# Patient Record
Sex: FEMALE | Race: WHITE | NOT HISPANIC OR LATINO | Employment: UNEMPLOYED | URBAN - NONMETROPOLITAN AREA
[De-identification: names, ages, dates, MRNs, and addresses within clinical notes are randomized per-mention and may not be internally consistent; named-entity substitution may affect disease eponyms.]

---

## 2017-01-01 ENCOUNTER — HOSPITAL ENCOUNTER (EMERGENCY)
Facility: HOSPITAL | Age: 52
Discharge: LEFT BEFORE DISCHARGE | End: 2017-08-18

## 2017-01-01 ENCOUNTER — TRANSCRIBE ORDERS (OUTPATIENT)
Dept: CARDIOLOGY | Facility: HOSPITAL | Age: 52
End: 2017-01-01

## 2017-01-01 ENCOUNTER — TRANSCRIBE ORDERS (OUTPATIENT)
Dept: ADMINISTRATIVE | Facility: HOSPITAL | Age: 52
End: 2017-01-01

## 2017-01-01 ENCOUNTER — OFFICE VISIT (OUTPATIENT)
Dept: ORTHOPEDIC SURGERY | Facility: CLINIC | Age: 52
End: 2017-01-01

## 2017-01-01 ENCOUNTER — TELEPHONE (OUTPATIENT)
Dept: NEUROLOGY | Facility: CLINIC | Age: 52
End: 2017-01-01

## 2017-01-01 ENCOUNTER — HOSPITAL ENCOUNTER (OUTPATIENT)
Facility: HOSPITAL | Age: 52
Discharge: HOME | End: 2017-10-06
Attending: INTERNAL MEDICINE | Admitting: INTERNAL MEDICINE

## 2017-01-01 ENCOUNTER — LAB REQUISITION (OUTPATIENT)
Dept: LAB | Facility: HOSPITAL | Age: 52
End: 2017-01-01

## 2017-01-01 ENCOUNTER — HOSPITAL ENCOUNTER (OUTPATIENT)
Facility: HOSPITAL | Age: 52
Discharge: HOME | End: 2017-08-18
Attending: INTERNAL MEDICINE | Admitting: INTERNAL MEDICINE

## 2017-01-01 ENCOUNTER — HOSPITAL ENCOUNTER (OUTPATIENT)
Facility: HOSPITAL | Age: 52
Discharge: HOME | End: 2017-08-04
Attending: INTERNAL MEDICINE | Admitting: INTERNAL MEDICINE

## 2017-01-01 ENCOUNTER — HOSPITAL ENCOUNTER (EMERGENCY)
Facility: HOSPITAL | Age: 52
Discharge: HOME | End: 2017-09-06
Attending: EMERGENCY MEDICINE | Admitting: EMERGENCY MEDICINE

## 2017-01-01 ENCOUNTER — APPOINTMENT (OUTPATIENT)
Dept: INFUSION THERAPY | Facility: HOSPITAL | Age: 52
End: 2017-01-01
Attending: INTERNAL MEDICINE

## 2017-01-01 ENCOUNTER — APPOINTMENT (OUTPATIENT)
Dept: ULTRASOUND IMAGING | Facility: HOSPITAL | Age: 52
End: 2017-01-01

## 2017-01-01 ENCOUNTER — OFFICE VISIT (OUTPATIENT)
Dept: NEUROLOGY | Facility: CLINIC | Age: 52
End: 2017-01-01

## 2017-01-01 ENCOUNTER — HOSPITAL ENCOUNTER (EMERGENCY)
Facility: HOSPITAL | Age: 52
Discharge: HOME | End: 2017-08-18
Attending: EMERGENCY MEDICINE | Admitting: EMERGENCY MEDICINE

## 2017-01-01 ENCOUNTER — LAB (OUTPATIENT)
Dept: LAB | Facility: HOSPITAL | Age: 52
End: 2017-01-01

## 2017-01-01 ENCOUNTER — HOSPITAL ENCOUNTER (OUTPATIENT)
Dept: MRI IMAGING | Facility: HOSPITAL | Age: 52
Discharge: HOME | End: 2017-09-14
Attending: PODIATRIST | Admitting: PODIATRIST

## 2017-01-01 VITALS
HEART RATE: 105 BPM | TEMPERATURE: 97.7 F | WEIGHT: 293 LBS | SYSTOLIC BLOOD PRESSURE: 137 MMHG | OXYGEN SATURATION: 93 % | BODY MASS INDEX: 47.09 KG/M2 | DIASTOLIC BLOOD PRESSURE: 82 MMHG | HEIGHT: 66 IN | RESPIRATION RATE: 19 BRPM

## 2017-01-01 VITALS — BODY MASS INDEX: 47.09 KG/M2 | RESPIRATION RATE: 18 BRPM | HEIGHT: 66 IN | WEIGHT: 293 LBS

## 2017-01-01 VITALS
OXYGEN SATURATION: 94 % | DIASTOLIC BLOOD PRESSURE: 89 MMHG | TEMPERATURE: 97.6 F | HEART RATE: 95 BPM | SYSTOLIC BLOOD PRESSURE: 135 MMHG | RESPIRATION RATE: 18 BRPM

## 2017-01-01 VITALS — WEIGHT: 293 LBS | HEIGHT: 66 IN | RESPIRATION RATE: 16 BRPM | BODY MASS INDEX: 47.09 KG/M2

## 2017-01-01 VITALS
HEIGHT: 66 IN | HEART RATE: 100 BPM | BODY MASS INDEX: 47.09 KG/M2 | OXYGEN SATURATION: 94 % | TEMPERATURE: 98 F | DIASTOLIC BLOOD PRESSURE: 91 MMHG | WEIGHT: 293 LBS | RESPIRATION RATE: 18 BRPM | SYSTOLIC BLOOD PRESSURE: 155 MMHG

## 2017-01-01 VITALS
HEIGHT: 66 IN | HEART RATE: 90 BPM | OXYGEN SATURATION: 96 % | WEIGHT: 293 LBS | SYSTOLIC BLOOD PRESSURE: 124 MMHG | DIASTOLIC BLOOD PRESSURE: 70 MMHG | BODY MASS INDEX: 47.09 KG/M2

## 2017-01-01 VITALS
HEIGHT: 66 IN | BODY MASS INDEX: 47.09 KG/M2 | SYSTOLIC BLOOD PRESSURE: 124 MMHG | RESPIRATION RATE: 20 BRPM | OXYGEN SATURATION: 93 % | HEART RATE: 81 BPM | TEMPERATURE: 98.4 F | WEIGHT: 293 LBS | DIASTOLIC BLOOD PRESSURE: 83 MMHG

## 2017-01-01 VITALS
SYSTOLIC BLOOD PRESSURE: 137 MMHG | BODY MASS INDEX: 47.09 KG/M2 | DIASTOLIC BLOOD PRESSURE: 86 MMHG | HEIGHT: 66 IN | OXYGEN SATURATION: 94 % | RESPIRATION RATE: 20 BRPM | TEMPERATURE: 97.7 F | WEIGHT: 293 LBS | HEART RATE: 95 BPM

## 2017-01-01 VITALS
TEMPERATURE: 97.2 F | HEART RATE: 78 BPM | HEIGHT: 66 IN | SYSTOLIC BLOOD PRESSURE: 123 MMHG | RESPIRATION RATE: 16 BRPM | DIASTOLIC BLOOD PRESSURE: 60 MMHG | OXYGEN SATURATION: 96 % | BODY MASS INDEX: 47.09 KG/M2 | WEIGHT: 293 LBS

## 2017-01-01 VITALS — BODY MASS INDEX: 47.09 KG/M2 | WEIGHT: 293 LBS | RESPIRATION RATE: 16 BRPM | HEIGHT: 66 IN

## 2017-01-01 VITALS — BODY MASS INDEX: 48.82 KG/M2 | HEIGHT: 65 IN | RESPIRATION RATE: 18 BRPM | WEIGHT: 293 LBS

## 2017-01-01 DIAGNOSIS — I73.9 PVD (PERIPHERAL VASCULAR DISEASE) (HCC): ICD-10-CM

## 2017-01-01 DIAGNOSIS — E66.01 MORBID (SEVERE) OBESITY DUE TO EXCESS CALORIES (HCC): ICD-10-CM

## 2017-01-01 DIAGNOSIS — I87.2 VENOUS INSUFFICIENCY (CHRONIC) (PERIPHERAL): ICD-10-CM

## 2017-01-01 DIAGNOSIS — M54.2 CERVICALGIA: Primary | ICD-10-CM

## 2017-01-01 DIAGNOSIS — T82.898A OCCLUDED PICC LINE, INITIAL ENCOUNTER (HCC): Primary | ICD-10-CM

## 2017-01-01 DIAGNOSIS — E11.622 TYPE 2 DIABETES MELLITUS WITH OTHER SKIN ULCER (HCC): ICD-10-CM

## 2017-01-01 DIAGNOSIS — R60.0 EDEMA OF EXTREMITY OF UNKNOWN CAUSE: ICD-10-CM

## 2017-01-01 DIAGNOSIS — L98.499 TYPE 2 DIABETES MELLITUS WITH OTHER SKIN ULCER (HCC): ICD-10-CM

## 2017-01-01 DIAGNOSIS — E11.622 TYPE 2 DIABETES MELLITUS WITH OTHER SKIN ULCER (HCC): Primary | ICD-10-CM

## 2017-01-01 DIAGNOSIS — L97.511 ULCER OF FOOT, RIGHT, LIMITED TO BREAKDOWN OF SKIN (HCC): ICD-10-CM

## 2017-01-01 DIAGNOSIS — L03.119 CELLULITIS AND ABSCESS OF LEG: ICD-10-CM

## 2017-01-01 DIAGNOSIS — L97.511 ULCER OF FOOT, RIGHT, LIMITED TO BREAKDOWN OF SKIN (HCC): Primary | ICD-10-CM

## 2017-01-01 DIAGNOSIS — L03.115 CELLULITIS OF RIGHT LOWER EXTREMITY: ICD-10-CM

## 2017-01-01 DIAGNOSIS — E11.622 TYPE 2 DIABETES MELLITUS WITH OTHER SKIN ULCER, WITH LONG-TERM CURRENT USE OF INSULIN (HCC): ICD-10-CM

## 2017-01-01 DIAGNOSIS — M54.2 CERVICALGIA: ICD-10-CM

## 2017-01-01 DIAGNOSIS — Z79.4 TYPE 2 DIABETES MELLITUS WITH OTHER SKIN ULCER, WITH LONG-TERM CURRENT USE OF INSULIN (HCC): ICD-10-CM

## 2017-01-01 DIAGNOSIS — I73.9 PVD (PERIPHERAL VASCULAR DISEASE) (HCC): Primary | ICD-10-CM

## 2017-01-01 DIAGNOSIS — E11.9 TYPE 2 DIABETES MELLITUS WITHOUT COMPLICATIONS (HCC): ICD-10-CM

## 2017-01-01 DIAGNOSIS — L03.90 CELLULITIS: ICD-10-CM

## 2017-01-01 DIAGNOSIS — G44.221 CHRONIC TENSION-TYPE HEADACHE, INTRACTABLE: ICD-10-CM

## 2017-01-01 DIAGNOSIS — L03.119 CELLULITIS OF EXTREMITY: ICD-10-CM

## 2017-01-01 DIAGNOSIS — L98.499 TYPE 2 DIABETES MELLITUS WITH OTHER SKIN ULCER (HCC): Primary | ICD-10-CM

## 2017-01-01 DIAGNOSIS — L03.116 CELLULITIS OF LEFT LEG: Primary | ICD-10-CM

## 2017-01-01 DIAGNOSIS — L03.119 CELLULITIS OF LOWER EXTREMITY, UNSPECIFIED LATERALITY: ICD-10-CM

## 2017-01-01 DIAGNOSIS — M79.3 PANNICULITIS: ICD-10-CM

## 2017-01-01 DIAGNOSIS — L03.116 CELLULITIS OF LEFT LOWER EXTREMITY: ICD-10-CM

## 2017-01-01 DIAGNOSIS — I87.2 VENOUS INSUFFICIENCY (CHRONIC) (PERIPHERAL): Primary | ICD-10-CM

## 2017-01-01 DIAGNOSIS — L02.419 CELLULITIS AND ABSCESS OF LEG: ICD-10-CM

## 2017-01-01 DIAGNOSIS — L03.115 CELLULITIS OF RIGHT LEG: Primary | ICD-10-CM

## 2017-01-01 LAB
ALBUMIN SERPL-MCNC: 3.6 G/DL (ref 3.5–5)
ALBUMIN SERPL-MCNC: 3.7 G/DL (ref 3.5–5)
ALBUMIN SERPL-MCNC: 3.8 G/DL (ref 3.5–5)
ALBUMIN SERPL-MCNC: 4 G/DL (ref 3.5–5)
ALBUMIN SERPL-MCNC: 4 G/DL (ref 3.5–5)
ALBUMIN SERPL-MCNC: 4.2 G/DL (ref 3.2–4.8)
ALBUMIN/GLOB SERPL: 1.2 G/DL (ref 1–2)
ALBUMIN/GLOB SERPL: 1.3 G/DL (ref 1–2)
ALBUMIN/GLOB SERPL: 1.3 G/DL (ref 1–2)
ALBUMIN/GLOB SERPL: 1.4 G/DL (ref 1–2)
ALBUMIN/GLOB SERPL: 1.4 G/DL (ref 1–2)
ALBUMIN/GLOB SERPL: 1.5 G/DL (ref 1.5–2.5)
ALP SERPL-CCNC: 115 U/L (ref 25–100)
ALP SERPL-CCNC: 78 U/L (ref 38–126)
ALP SERPL-CCNC: 79 U/L (ref 38–126)
ALP SERPL-CCNC: 90 U/L (ref 38–126)
ALP SERPL-CCNC: 91 U/L (ref 38–126)
ALP SERPL-CCNC: 91 U/L (ref 38–126)
ALT SERPL W P-5'-P-CCNC: 28 U/L (ref 7–40)
ALT SERPL W P-5'-P-CCNC: 36 U/L (ref 13–69)
ALT SERPL W P-5'-P-CCNC: 37 U/L (ref 13–69)
ALT SERPL W P-5'-P-CCNC: 37 U/L (ref 13–69)
ALT SERPL W P-5'-P-CCNC: 40 U/L (ref 13–69)
ALT SERPL W P-5'-P-CCNC: 42 U/L (ref 13–69)
ANION GAP SERPL CALCULATED.3IONS-SCNC: 13.1 MMOL/L
ANION GAP SERPL CALCULATED.3IONS-SCNC: 13.8 MMOL/L
ANION GAP SERPL CALCULATED.3IONS-SCNC: 14.7 MMOL/L
ANION GAP SERPL CALCULATED.3IONS-SCNC: 15.8 MMOL/L
ANION GAP SERPL CALCULATED.3IONS-SCNC: 15.8 MMOL/L
ANION GAP SERPL CALCULATED.3IONS-SCNC: 18 MMOL/L
ANION GAP SERPL CALCULATED.3IONS-SCNC: 3 MMOL/L (ref 3–11)
APTT PPP: 28.8 SECONDS (ref 25–36)
APTT PPP: 31.2 SECONDS (ref 25–36)
AST SERPL-CCNC: 16 U/L (ref 15–46)
AST SERPL-CCNC: 17 U/L (ref 15–46)
AST SERPL-CCNC: 19 U/L (ref 15–46)
AST SERPL-CCNC: 20 U/L (ref 0–33)
AST SERPL-CCNC: 21 U/L (ref 15–46)
AST SERPL-CCNC: 24 U/L (ref 15–46)
BACTERIA SPEC AEROBE CULT: ABNORMAL
BASOPHILS # BLD AUTO: 0.03 10*3/MM3 (ref 0–0.2)
BASOPHILS # BLD AUTO: 0.04 10*3/MM3 (ref 0–0.2)
BASOPHILS # BLD AUTO: 0.05 10*3/MM3 (ref 0–0.2)
BASOPHILS # BLD AUTO: 0.06 10*3/MM3 (ref 0–0.2)
BASOPHILS # BLD AUTO: 0.06 10*3/MM3 (ref 0–0.2)
BASOPHILS # BLD AUTO: 0.07 10*3/MM3 (ref 0–0.2)
BASOPHILS NFR BLD AUTO: 0.3 % (ref 0–1)
BASOPHILS NFR BLD AUTO: 0.4 % (ref 0–2.5)
BASOPHILS NFR BLD AUTO: 0.5 % (ref 0–2.5)
BASOPHILS NFR BLD AUTO: 0.5 % (ref 0–2.5)
BASOPHILS NFR BLD AUTO: 0.6 % (ref 0–2.5)
BASOPHILS NFR BLD AUTO: 0.6 % (ref 0–2.5)
BILIRUB SERPL-MCNC: 0.3 MG/DL (ref 0.2–1.3)
BILIRUB SERPL-MCNC: 0.3 MG/DL (ref 0.2–1.3)
BILIRUB SERPL-MCNC: 0.3 MG/DL (ref 0.3–1.2)
BILIRUB SERPL-MCNC: 0.4 MG/DL (ref 0.2–1.3)
BILIRUB SERPL-MCNC: 0.4 MG/DL (ref 0.2–1.3)
BILIRUB SERPL-MCNC: 0.5 MG/DL (ref 0.2–1.3)
BUN BLD-MCNC: 19 MG/DL (ref 7–20)
BUN BLD-MCNC: 24 MG/DL (ref 7–20)
BUN BLD-MCNC: 31 MG/DL (ref 7–20)
BUN BLD-MCNC: 32 MG/DL (ref 9–23)
BUN BLD-MCNC: 42 MG/DL (ref 7–20)
BUN BLD-MCNC: 47 MG/DL (ref 7–20)
BUN BLD-MCNC: 50 MG/DL (ref 7–20)
BUN/CREAT SERPL: 15.8 (ref 7.1–23.5)
BUN/CREAT SERPL: 16 (ref 7.1–23.5)
BUN/CREAT SERPL: 23.8 (ref 7.1–23.5)
BUN/CREAT SERPL: 24.6 (ref 7–25)
BUN/CREAT SERPL: 27.6 (ref 7.1–23.5)
BUN/CREAT SERPL: 30 (ref 7.1–23.5)
BUN/CREAT SERPL: 38.5 (ref 7.1–23.5)
CALCIUM SPEC-SCNC: 9 MG/DL (ref 8.4–10.2)
CALCIUM SPEC-SCNC: 9.1 MG/DL (ref 8.4–10.2)
CALCIUM SPEC-SCNC: 9.1 MG/DL (ref 8.4–10.2)
CALCIUM SPEC-SCNC: 9.2 MG/DL (ref 8.4–10.2)
CALCIUM SPEC-SCNC: 9.3 MG/DL (ref 8.4–10.2)
CALCIUM SPEC-SCNC: 9.4 MG/DL (ref 8.4–10.2)
CALCIUM SPEC-SCNC: 9.5 MG/DL (ref 8.7–10.4)
CHLORIDE SERPL-SCNC: 100 MMOL/L (ref 98–107)
CHLORIDE SERPL-SCNC: 101 MMOL/L (ref 98–107)
CHLORIDE SERPL-SCNC: 101 MMOL/L (ref 99–109)
CHLORIDE SERPL-SCNC: 104 MMOL/L (ref 98–107)
CHLORIDE SERPL-SCNC: 94 MMOL/L (ref 98–107)
CHLORIDE SERPL-SCNC: 96 MMOL/L (ref 98–107)
CHLORIDE SERPL-SCNC: 99 MMOL/L (ref 98–107)
CO2 SERPL-SCNC: 27 MMOL/L (ref 26–30)
CO2 SERPL-SCNC: 27 MMOL/L (ref 26–30)
CO2 SERPL-SCNC: 28 MMOL/L (ref 26–30)
CO2 SERPL-SCNC: 29 MMOL/L (ref 26–30)
CO2 SERPL-SCNC: 29 MMOL/L (ref 26–30)
CO2 SERPL-SCNC: 30 MMOL/L (ref 20–31)
CO2 SERPL-SCNC: 35 MMOL/L (ref 26–30)
CREAT BLD-MCNC: 1.2 MG/DL (ref 0.6–1.3)
CREAT BLD-MCNC: 1.3 MG/DL (ref 0.6–1.3)
CREAT BLD-MCNC: 1.4 MG/DL (ref 0.6–1.3)
CREAT BLD-MCNC: 1.5 MG/DL (ref 0.6–1.3)
CREAT BLD-MCNC: 1.7 MG/DL (ref 0.6–1.3)
CRP SERPL-MCNC: 3.7 MG/DL (ref 0–1)
CRP SERPL-MCNC: 4.2 MG/DL (ref 0–1)
CRP SERPL-MCNC: 5.1 MG/DL (ref 0–1)
DEPRECATED RDW RBC AUTO: 48.3 FL (ref 37–54)
DEPRECATED RDW RBC AUTO: 48.9 FL (ref 37–54)
DEPRECATED RDW RBC AUTO: 49.5 FL (ref 37–54)
DEPRECATED RDW RBC AUTO: 50.3 FL (ref 37–54)
DEPRECATED RDW RBC AUTO: 51.5 FL (ref 37–54)
DEPRECATED RDW RBC AUTO: 53.9 FL (ref 37–54)
EOSINOPHIL # BLD AUTO: 0.23 10*3/MM3 (ref 0–0.7)
EOSINOPHIL # BLD AUTO: 0.24 10*3/MM3 (ref 0–0.7)
EOSINOPHIL # BLD AUTO: 0.25 10*3/MM3 (ref 0–0.3)
EOSINOPHIL # BLD AUTO: 0.26 10*3/MM3 (ref 0–0.7)
EOSINOPHIL # BLD AUTO: 0.26 10*3/MM3 (ref 0–0.7)
EOSINOPHIL # BLD AUTO: 0.3 10*3/MM3 (ref 0–0.7)
EOSINOPHIL NFR BLD AUTO: 1.8 % (ref 0–7)
EOSINOPHIL NFR BLD AUTO: 2.2 % (ref 0–3)
EOSINOPHIL NFR BLD AUTO: 2.4 % (ref 0–7)
EOSINOPHIL NFR BLD AUTO: 2.4 % (ref 0–7)
EOSINOPHIL NFR BLD AUTO: 2.7 % (ref 0–7)
EOSINOPHIL NFR BLD AUTO: 2.8 % (ref 0–7)
ERYTHROCYTE [DISTWIDTH] IN BLOOD BY AUTOMATED COUNT: 15.3 % (ref 11.5–14.5)
ERYTHROCYTE [DISTWIDTH] IN BLOOD BY AUTOMATED COUNT: 15.4 % (ref 11.5–14.5)
ERYTHROCYTE [DISTWIDTH] IN BLOOD BY AUTOMATED COUNT: 15.7 % (ref 11.5–14.5)
ERYTHROCYTE [DISTWIDTH] IN BLOOD BY AUTOMATED COUNT: 15.9 % (ref 11.5–14.5)
ERYTHROCYTE [DISTWIDTH] IN BLOOD BY AUTOMATED COUNT: 16 % (ref 11.3–14.5)
ERYTHROCYTE [DISTWIDTH] IN BLOOD BY AUTOMATED COUNT: 16.8 % (ref 11.5–14.5)
ERYTHROCYTE [SEDIMENTATION RATE] IN BLOOD: 33 MM/HR (ref 0–20)
ERYTHROCYTE [SEDIMENTATION RATE] IN BLOOD: 35 MM/HR (ref 0–20)
ERYTHROCYTE [SEDIMENTATION RATE] IN BLOOD: 35 MM/HR (ref 0–20)
GFR SERPL CREATININE-BSD FRML MDRD: 32 ML/MIN/1.73
GFR SERPL CREATININE-BSD FRML MDRD: 37 ML/MIN/1.73
GFR SERPL CREATININE-BSD FRML MDRD: 40 ML/MIN/1.73
GFR SERPL CREATININE-BSD FRML MDRD: 43 ML/MIN/1.73
GFR SERPL CREATININE-BSD FRML MDRD: 48 ML/MIN/1.73
GLOBULIN UR ELPH-MCNC: 2.7 GM/DL
GLOBULIN UR ELPH-MCNC: 2.8 GM/DL
GLOBULIN UR ELPH-MCNC: 2.9 GM/DL
GLOBULIN UR ELPH-MCNC: 3.2 GM/DL
GLUCOSE BLD-MCNC: 104 MG/DL (ref 74–98)
GLUCOSE BLD-MCNC: 113 MG/DL (ref 74–98)
GLUCOSE BLD-MCNC: 115 MG/DL (ref 74–98)
GLUCOSE BLD-MCNC: 127 MG/DL (ref 70–100)
GLUCOSE BLD-MCNC: 137 MG/DL (ref 74–98)
GLUCOSE BLD-MCNC: 150 MG/DL (ref 74–98)
GLUCOSE BLD-MCNC: 169 MG/DL (ref 74–98)
GRAM STN SPEC: ABNORMAL
HCT VFR BLD AUTO: 37.4 % (ref 37–47)
HCT VFR BLD AUTO: 38.6 % (ref 37–47)
HCT VFR BLD AUTO: 39.2 % (ref 37–47)
HCT VFR BLD AUTO: 39.3 % (ref 37–47)
HCT VFR BLD AUTO: 39.8 % (ref 37–47)
HCT VFR BLD AUTO: 41.4 % (ref 34.5–44)
HGB BLD-MCNC: 11.8 G/DL (ref 12–16)
HGB BLD-MCNC: 12.1 G/DL (ref 12–16)
HGB BLD-MCNC: 12.3 G/DL (ref 12–16)
HGB BLD-MCNC: 12.3 G/DL (ref 12–16)
HGB BLD-MCNC: 12.5 G/DL (ref 12–16)
HGB BLD-MCNC: 13.1 G/DL (ref 11.5–15.5)
HOLD SPECIMEN: NORMAL
HOLD SPECIMEN: NORMAL
IMM GRANULOCYTES # BLD: 0.04 10*3/MM3 (ref 0–0.03)
IMM GRANULOCYTES # BLD: 0.05 10*3/MM3 (ref 0–0.06)
IMM GRANULOCYTES # BLD: 0.08 10*3/MM3 (ref 0–0.06)
IMM GRANULOCYTES # BLD: 0.09 10*3/MM3 (ref 0–0.06)
IMM GRANULOCYTES NFR BLD: 0.4 % (ref 0–0.6)
IMM GRANULOCYTES NFR BLD: 0.5 % (ref 0–0.6)
IMM GRANULOCYTES NFR BLD: 0.5 % (ref 0–0.6)
IMM GRANULOCYTES NFR BLD: 0.6 % (ref 0–0.6)
IMM GRANULOCYTES NFR BLD: 0.7 % (ref 0–0.6)
IMM GRANULOCYTES NFR BLD: 0.7 % (ref 0–0.6)
INR PPP: 1.17 (ref 0.9–1.1)
INR PPP: 1.18 (ref 0.9–1.1)
LYMPHOCYTES # BLD AUTO: 1.85 10*3/MM3 (ref 0.6–3.4)
LYMPHOCYTES # BLD AUTO: 1.91 10*3/MM3 (ref 0.6–4.8)
LYMPHOCYTES # BLD AUTO: 2.04 10*3/MM3 (ref 0.6–3.4)
LYMPHOCYTES # BLD AUTO: 2.09 10*3/MM3 (ref 0.6–3.4)
LYMPHOCYTES # BLD AUTO: 2.18 10*3/MM3 (ref 0.6–3.4)
LYMPHOCYTES # BLD AUTO: 2.25 10*3/MM3 (ref 0.6–3.4)
LYMPHOCYTES NFR BLD AUTO: 16.5 % (ref 10–50)
LYMPHOCYTES NFR BLD AUTO: 17.1 % (ref 24–44)
LYMPHOCYTES NFR BLD AUTO: 19 % (ref 10–50)
LYMPHOCYTES NFR BLD AUTO: 19.9 % (ref 10–50)
LYMPHOCYTES NFR BLD AUTO: 20.3 % (ref 10–50)
LYMPHOCYTES NFR BLD AUTO: 21.7 % (ref 10–50)
MCH RBC QN AUTO: 27.3 PG (ref 27–31)
MCH RBC QN AUTO: 27.4 PG (ref 27–31)
MCH RBC QN AUTO: 27.5 PG (ref 27–31)
MCH RBC QN AUTO: 27.5 PG (ref 27–31)
MCH RBC QN AUTO: 27.6 PG (ref 27–31)
MCH RBC QN AUTO: 27.7 PG (ref 27–31)
MCHC RBC AUTO-ENTMCNC: 30.9 G/DL (ref 30–37)
MCHC RBC AUTO-ENTMCNC: 31.3 G/DL (ref 30–37)
MCHC RBC AUTO-ENTMCNC: 31.3 G/DL (ref 30–37)
MCHC RBC AUTO-ENTMCNC: 31.6 G/DL (ref 30–37)
MCHC RBC AUTO-ENTMCNC: 31.6 G/DL (ref 32–36)
MCHC RBC AUTO-ENTMCNC: 31.9 G/DL (ref 30–37)
MCV RBC AUTO: 86.8 FL (ref 80–99)
MCV RBC AUTO: 86.9 FL (ref 81–99)
MCV RBC AUTO: 87.5 FL (ref 81–99)
MCV RBC AUTO: 87.6 FL (ref 81–99)
MCV RBC AUTO: 87.7 FL (ref 81–99)
MCV RBC AUTO: 88.4 FL (ref 81–99)
MONOCYTES # BLD AUTO: 0.63 10*3/MM3 (ref 0–0.9)
MONOCYTES # BLD AUTO: 0.69 10*3/MM3 (ref 0–0.9)
MONOCYTES # BLD AUTO: 0.73 10*3/MM3 (ref 0–1)
MONOCYTES # BLD AUTO: 0.76 10*3/MM3 (ref 0–0.9)
MONOCYTES # BLD AUTO: 0.78 10*3/MM3 (ref 0–0.9)
MONOCYTES # BLD AUTO: 0.85 10*3/MM3 (ref 0–0.9)
MONOCYTES NFR BLD AUTO: 6 % (ref 0–12)
MONOCYTES NFR BLD AUTO: 6.4 % (ref 0–12)
MONOCYTES NFR BLD AUTO: 6.5 % (ref 0–12)
MONOCYTES NFR BLD AUTO: 7 % (ref 0–12)
MONOCYTES NFR BLD AUTO: 7.4 % (ref 0–12)
MONOCYTES NFR BLD AUTO: 7.8 % (ref 0–12)
NEUTROPHILS # BLD AUTO: 5.72 10*3/MM3 (ref 2–6.9)
NEUTROPHILS # BLD AUTO: 7.54 10*3/MM3 (ref 2–6.9)
NEUTROPHILS # BLD AUTO: 7.62 10*3/MM3 (ref 2–6.9)
NEUTROPHILS # BLD AUTO: 7.63 10*3/MM3 (ref 2–6.9)
NEUTROPHILS # BLD AUTO: 8.24 10*3/MM3 (ref 1.5–8.3)
NEUTROPHILS # BLD AUTO: 9.41 10*3/MM3 (ref 2–6.9)
NEUTROPHILS NFR BLD AUTO: 67 % (ref 37–80)
NEUTROPHILS NFR BLD AUTO: 68.8 % (ref 37–80)
NEUTROPHILS NFR BLD AUTO: 68.8 % (ref 37–80)
NEUTROPHILS NFR BLD AUTO: 71.1 % (ref 37–80)
NEUTROPHILS NFR BLD AUTO: 73.5 % (ref 41–71)
NEUTROPHILS NFR BLD AUTO: 74.6 % (ref 37–80)
NRBC BLD MANUAL-RTO: 0 /100 WBC (ref 0–0)
PLATELET # BLD AUTO: 181 10*3/MM3 (ref 130–400)
PLATELET # BLD AUTO: 199 10*3/MM3 (ref 130–400)
PLATELET # BLD AUTO: 205 10*3/MM3 (ref 130–400)
PLATELET # BLD AUTO: 207 10*3/MM3 (ref 130–400)
PLATELET # BLD AUTO: 217 10*3/MM3 (ref 130–400)
PLATELET # BLD AUTO: 237 10*3/MM3 (ref 150–450)
PMV BLD AUTO: 10.5 FL (ref 6–12)
PMV BLD AUTO: 10.5 FL (ref 6–12)
PMV BLD AUTO: 10.8 FL (ref 6–12)
PMV BLD AUTO: 11 FL (ref 6–12)
PMV BLD AUTO: 11.2 FL (ref 6–12)
PMV BLD AUTO: 11.3 FL (ref 6–12)
POTASSIUM BLD-SCNC: 2.8 MMOL/L (ref 3.5–5.1)
POTASSIUM BLD-SCNC: 3 MMOL/L (ref 3.5–5.1)
POTASSIUM BLD-SCNC: 3.8 MMOL/L (ref 3.5–5.1)
POTASSIUM BLD-SCNC: 4.1 MMOL/L (ref 3.5–5.1)
POTASSIUM BLD-SCNC: 4.7 MMOL/L (ref 3.5–5.1)
POTASSIUM BLD-SCNC: 4.7 MMOL/L (ref 3.5–5.5)
POTASSIUM BLD-SCNC: 4.8 MMOL/L (ref 3.5–5.1)
PROT SERPL-MCNC: 6.3 G/DL (ref 6.3–8.2)
PROT SERPL-MCNC: 6.6 G/DL (ref 6.3–8.2)
PROT SERPL-MCNC: 6.9 G/DL (ref 6.3–8.2)
PROT SERPL-MCNC: 6.9 G/DL (ref 6.3–8.2)
PROT SERPL-MCNC: 7 G/DL (ref 5.7–8.2)
PROT SERPL-MCNC: 7 G/DL (ref 6.3–8.2)
PROTHROMBIN TIME: 12.8 SECONDS (ref 9.3–12.1)
PROTHROMBIN TIME: 12.9 SECONDS (ref 9.3–12.1)
RBC # BLD AUTO: 4.27 10*6/MM3 (ref 4.2–5.4)
RBC # BLD AUTO: 4.41 10*6/MM3 (ref 4.2–5.4)
RBC # BLD AUTO: 4.48 10*6/MM3 (ref 4.2–5.4)
RBC # BLD AUTO: 4.5 10*6/MM3 (ref 4.2–5.4)
RBC # BLD AUTO: 4.51 10*6/MM3 (ref 4.2–5.4)
RBC # BLD AUTO: 4.77 10*6/MM3 (ref 3.89–5.14)
SODIUM BLD-SCNC: 134 MMOL/L (ref 132–146)
SODIUM BLD-SCNC: 136 MMOL/L (ref 137–145)
SODIUM BLD-SCNC: 137 MMOL/L (ref 137–145)
SODIUM BLD-SCNC: 139 MMOL/L (ref 137–145)
SODIUM BLD-SCNC: 141 MMOL/L (ref 137–145)
SODIUM BLD-SCNC: 142 MMOL/L (ref 137–145)
SODIUM BLD-SCNC: 142 MMOL/L (ref 137–145)
VANCOMYCIN TROUGH SERPL-MCNC: 10.2 MCG/ML (ref 5–15)
VANCOMYCIN TROUGH SERPL-MCNC: 8.49 MCG/ML (ref 5–15)
VANCOMYCIN TROUGH SERPL-MCNC: 8.71 MCG/ML (ref 5–15)
WBC NRBC COR # BLD: 10.72 10*3/MM3 (ref 4.8–10.8)
WBC NRBC COR # BLD: 10.95 10*3/MM3 (ref 4.8–10.8)
WBC NRBC COR # BLD: 11.1 10*3/MM3 (ref 4.8–10.8)
WBC NRBC COR # BLD: 11.2 10*3/MM3 (ref 3.5–10.8)
WBC NRBC COR # BLD: 12.63 10*3/MM3 (ref 4.8–10.8)
WBC NRBC COR # BLD: 8.53 10*3/MM3 (ref 4.8–10.8)
WHOLE BLOOD HOLD SPECIMEN: NORMAL
WHOLE BLOOD HOLD SPECIMEN: NORMAL

## 2017-01-01 PROCEDURE — 85610 PROTHROMBIN TIME: CPT | Performed by: INTERNAL MEDICINE

## 2017-01-01 PROCEDURE — 99213 OFFICE O/P EST LOW 20 MIN: CPT | Performed by: PODIATRIST

## 2017-01-01 PROCEDURE — 87205 SMEAR GRAM STAIN: CPT | Performed by: INTERNAL MEDICINE

## 2017-01-01 PROCEDURE — C1894 INTRO/SHEATH, NON-LASER: HCPCS | Performed by: INTERNAL MEDICINE

## 2017-01-01 PROCEDURE — 80053 COMPREHEN METABOLIC PANEL: CPT | Performed by: INTERNAL MEDICINE

## 2017-01-01 PROCEDURE — C1753 CATH, INTRAVAS ULTRASOUND: HCPCS | Performed by: INTERNAL MEDICINE

## 2017-01-01 PROCEDURE — 80048 BASIC METABOLIC PNL TOTAL CA: CPT

## 2017-01-01 PROCEDURE — 86140 C-REACTIVE PROTEIN: CPT | Performed by: INTERNAL MEDICINE

## 2017-01-01 PROCEDURE — C1725 CATH, TRANSLUMIN NON-LASER: HCPCS | Performed by: INTERNAL MEDICINE

## 2017-01-01 PROCEDURE — 37252 INTRVASC US NONCORONARY 1ST: CPT | Performed by: INTERNAL MEDICINE

## 2017-01-01 PROCEDURE — 29580 STRAPPING UNNA BOOT: CPT | Performed by: PODIATRIST

## 2017-01-01 PROCEDURE — 99204 OFFICE O/P NEW MOD 45 MIN: CPT | Performed by: PODIATRIST

## 2017-01-01 PROCEDURE — 85651 RBC SED RATE NONAUTOMATED: CPT | Performed by: INTERNAL MEDICINE

## 2017-01-01 PROCEDURE — 80202 ASSAY OF VANCOMYCIN: CPT | Performed by: INTERNAL MEDICINE

## 2017-01-01 PROCEDURE — 87070 CULTURE OTHR SPECIMN AEROBIC: CPT | Performed by: PODIATRIST

## 2017-01-01 PROCEDURE — 73718 MRI LOWER EXTREMITY W/O DYE: CPT

## 2017-01-01 PROCEDURE — 25010000002 ONDANSETRON PER 1 MG: Performed by: INTERNAL MEDICINE

## 2017-01-01 PROCEDURE — 99283 EMERGENCY DEPT VISIT LOW MDM: CPT

## 2017-01-01 PROCEDURE — 87070 CULTURE OTHR SPECIMN AEROBIC: CPT | Performed by: INTERNAL MEDICINE

## 2017-01-01 PROCEDURE — 80053 COMPREHEN METABOLIC PANEL: CPT | Performed by: EMERGENCY MEDICINE

## 2017-01-01 PROCEDURE — 25010000002 MIDAZOLAM PER 1 MG: Performed by: INTERNAL MEDICINE

## 2017-01-01 PROCEDURE — 25010000002 HEPARIN (PORCINE) PER 1000 UNITS: Performed by: INTERNAL MEDICINE

## 2017-01-01 PROCEDURE — 85025 COMPLETE CBC W/AUTO DIFF WBC: CPT | Performed by: INTERNAL MEDICINE

## 2017-01-01 PROCEDURE — C1769 GUIDE WIRE: HCPCS | Performed by: INTERNAL MEDICINE

## 2017-01-01 PROCEDURE — 99213 OFFICE O/P EST LOW 20 MIN: CPT | Performed by: NURSE PRACTITIONER

## 2017-01-01 PROCEDURE — 99282 EMERGENCY DEPT VISIT SF MDM: CPT

## 2017-01-01 PROCEDURE — 87077 CULTURE AEROBIC IDENTIFY: CPT | Performed by: PODIATRIST

## 2017-01-01 PROCEDURE — 87186 SC STD MICRODIL/AGAR DIL: CPT | Performed by: PODIATRIST

## 2017-01-01 PROCEDURE — C1760 CLOSURE DEV, VASC: HCPCS | Performed by: INTERNAL MEDICINE

## 2017-01-01 PROCEDURE — 0 IOPAMIDOL PER 1 ML: Performed by: INTERNAL MEDICINE

## 2017-01-01 PROCEDURE — 36415 COLL VENOUS BLD VENIPUNCTURE: CPT

## 2017-01-01 PROCEDURE — 75820 VEIN X-RAY ARM/LEG: CPT | Performed by: INTERNAL MEDICINE

## 2017-01-01 PROCEDURE — 85025 COMPLETE CBC W/AUTO DIFF WBC: CPT | Performed by: EMERGENCY MEDICINE

## 2017-01-01 PROCEDURE — C1894 INTRO/SHEATH, NON-LASER: HCPCS

## 2017-01-01 PROCEDURE — C1876 STENT, NON-COA/NON-COV W/DEL: HCPCS | Performed by: INTERNAL MEDICINE

## 2017-01-01 PROCEDURE — 37253 INTRVASC US NONCORONARY ADDL: CPT | Performed by: INTERNAL MEDICINE

## 2017-01-01 PROCEDURE — 25010000002 FENTANYL CITRATE (PF) 100 MCG/2ML SOLUTION: Performed by: INTERNAL MEDICINE

## 2017-01-01 PROCEDURE — 87186 SC STD MICRODIL/AGAR DIL: CPT | Performed by: INTERNAL MEDICINE

## 2017-01-01 PROCEDURE — 87077 CULTURE AEROBIC IDENTIFY: CPT | Performed by: INTERNAL MEDICINE

## 2017-01-01 PROCEDURE — 76937 US GUIDE VASCULAR ACCESS: CPT

## 2017-01-01 PROCEDURE — 85730 THROMBOPLASTIN TIME PARTIAL: CPT | Performed by: INTERNAL MEDICINE

## 2017-01-01 PROCEDURE — 99201: CPT

## 2017-01-01 DEVICE — SELF-EXPANDING STENT
Type: IMPLANTABLE DEVICE | Status: FUNCTIONAL
Brand: WALLSTENT® ENDOPROSTHESIS

## 2017-01-01 RX ORDER — DOXYCYCLINE HYCLATE 100 MG/1
CAPSULE ORAL
Refills: 0 | COMMUNITY
Start: 2017-07-10 | End: 2017-01-01

## 2017-01-01 RX ORDER — FLUCONAZOLE 100 MG/1
TABLET ORAL
Refills: 0 | COMMUNITY
Start: 2017-01-01

## 2017-01-01 RX ORDER — ACETAMINOPHEN 325 MG/1
650 TABLET ORAL EVERY 4 HOURS PRN
Status: DISCONTINUED | OUTPATIENT
Start: 2017-01-01 | End: 2017-01-01 | Stop reason: HOSPADM

## 2017-01-01 RX ORDER — CLOPIDOGREL BISULFATE 75 MG/1
75 TABLET ORAL DAILY
Status: DISCONTINUED | OUTPATIENT
Start: 2017-01-01 | End: 2017-01-01 | Stop reason: HOSPADM

## 2017-01-01 RX ORDER — SODIUM CHLORIDE 0.9 % (FLUSH) 0.9 %
SYRINGE (ML) INJECTION
COMMUNITY
Start: 2017-01-01 | End: 2017-01-01

## 2017-01-01 RX ORDER — PRAMIPEXOLE DIHYDROCHLORIDE 1 MG/1
1.5 TABLET ORAL 3 TIMES DAILY
COMMUNITY
End: 2017-01-01 | Stop reason: SDUPTHER

## 2017-01-01 RX ORDER — ONDANSETRON 4 MG/1
TABLET, ORALLY DISINTEGRATING ORAL
Refills: 0 | COMMUNITY
Start: 2017-01-01

## 2017-01-01 RX ORDER — SODIUM CHLORIDE 9 MG/ML
100 INJECTION, SOLUTION INTRAVENOUS CONTINUOUS
Status: DISCONTINUED | OUTPATIENT
Start: 2017-01-01 | End: 2017-01-01 | Stop reason: HOSPADM

## 2017-01-01 RX ORDER — CLINDAMYCIN HYDROCHLORIDE 300 MG/1
300 CAPSULE ORAL 3 TIMES DAILY
Qty: 60 CAPSULE | Refills: 3 | Status: SHIPPED | OUTPATIENT
Start: 2017-01-01 | End: 2017-01-01

## 2017-01-01 RX ORDER — MIDAZOLAM HYDROCHLORIDE 1 MG/ML
INJECTION INTRAMUSCULAR; INTRAVENOUS AS NEEDED
Status: DISCONTINUED | OUTPATIENT
Start: 2017-01-01 | End: 2017-01-01 | Stop reason: HOSPADM

## 2017-01-01 RX ORDER — ONDANSETRON 2 MG/ML
4 INJECTION INTRAMUSCULAR; INTRAVENOUS EVERY 6 HOURS PRN
Status: DISCONTINUED | OUTPATIENT
Start: 2017-01-01 | End: 2017-01-01 | Stop reason: HOSPADM

## 2017-01-01 RX ORDER — POTASSIUM CHLORIDE 750 MG/1
40 CAPSULE, EXTENDED RELEASE ORAL ONCE
Status: COMPLETED | OUTPATIENT
Start: 2017-01-01 | End: 2017-01-01

## 2017-01-01 RX ORDER — NITROGLYCERIN 0.4 MG/1
TABLET SUBLINGUAL
COMMUNITY

## 2017-01-01 RX ORDER — CLOPIDOGREL BISULFATE 75 MG/1
TABLET ORAL AS NEEDED
Status: DISCONTINUED | OUTPATIENT
Start: 2017-01-01 | End: 2017-01-01 | Stop reason: HOSPADM

## 2017-01-01 RX ORDER — IPRATROPIUM BROMIDE 42 UG/1
2 SPRAY, METERED NASAL 4 TIMES DAILY
COMMUNITY

## 2017-01-01 RX ORDER — BACLOFEN 10 MG/1
TABLET ORAL
Refills: 1 | COMMUNITY
Start: 2017-01-01 | End: 2017-01-01

## 2017-01-01 RX ORDER — SODIUM CHLORIDE 0.9 % (FLUSH) 0.9 %
10 SYRINGE (ML) INJECTION AS NEEDED
Status: DISCONTINUED | OUTPATIENT
Start: 2017-01-01 | End: 2017-01-01 | Stop reason: HOSPADM

## 2017-01-01 RX ORDER — ONDANSETRON 4 MG/1
4 TABLET, ORALLY DISINTEGRATING ORAL EVERY 6 HOURS PRN
Status: DISCONTINUED | OUTPATIENT
Start: 2017-01-01 | End: 2017-01-01 | Stop reason: HOSPADM

## 2017-01-01 RX ORDER — SPIRONOLACTONE 25 MG/1
TABLET ORAL
Refills: 3 | COMMUNITY
Start: 2017-01-01 | End: 2017-01-01

## 2017-01-01 RX ORDER — LIDOCAINE HYDROCHLORIDE 10 MG/ML
INJECTION, SOLUTION INFILTRATION; PERINEURAL AS NEEDED
Status: DISCONTINUED | OUTPATIENT
Start: 2017-01-01 | End: 2017-01-01 | Stop reason: HOSPADM

## 2017-01-01 RX ORDER — HYDROCODONE BITARTRATE AND ACETAMINOPHEN 5; 325 MG/1; MG/1
1 TABLET ORAL EVERY 4 HOURS PRN
Status: DISCONTINUED | OUTPATIENT
Start: 2017-01-01 | End: 2017-01-01 | Stop reason: HOSPADM

## 2017-01-01 RX ORDER — ESTRADIOL 2 MG/1
TABLET ORAL
Refills: 5 | COMMUNITY
Start: 2017-07-21 | End: 2017-01-01

## 2017-01-01 RX ORDER — SPIRONOLACTONE 50 MG/1
TABLET, FILM COATED ORAL
Refills: 2 | COMMUNITY
Start: 2017-01-01 | End: 2017-01-01 | Stop reason: SDUPTHER

## 2017-01-01 RX ORDER — ACETAMINOPHEN 325 MG/1
650 TABLET ORAL EVERY 4 HOURS PRN
Status: DISCONTINUED | OUTPATIENT
Start: 2017-01-01 | End: 2017-01-01

## 2017-01-01 RX ORDER — ONDANSETRON 4 MG/1
4 TABLET, FILM COATED ORAL EVERY 6 HOURS PRN
Status: DISCONTINUED | OUTPATIENT
Start: 2017-01-01 | End: 2017-01-01 | Stop reason: HOSPADM

## 2017-01-01 RX ORDER — CLINDAMYCIN PHOSPHATE 11.9 MG/ML
SOLUTION TOPICAL
Refills: 1 | Status: ON HOLD | COMMUNITY
Start: 2017-01-01 | End: 2017-01-01

## 2017-01-01 RX ORDER — HEPARIN SODIUM 1000 [USP'U]/ML
INJECTION, SOLUTION INTRAVENOUS; SUBCUTANEOUS AS NEEDED
Status: DISCONTINUED | OUTPATIENT
Start: 2017-01-01 | End: 2017-01-01 | Stop reason: HOSPADM

## 2017-01-01 RX ORDER — CIPROFLOXACIN 500 MG/1
500 TABLET, FILM COATED ORAL DAILY
Qty: 20 TABLET | Refills: 3 | Status: ON HOLD | OUTPATIENT
Start: 2017-01-01 | End: 2017-01-01

## 2017-01-01 RX ORDER — DIPHENHYDRAMINE HCL 50 MG
CAPSULE ORAL
COMMUNITY
Start: 2017-01-01 | End: 2017-01-01

## 2017-01-01 RX ORDER — CLOPIDOGREL BISULFATE 75 MG/1
TABLET ORAL
Refills: 3 | COMMUNITY
Start: 2017-01-01

## 2017-01-01 RX ORDER — CLINDAMYCIN HYDROCHLORIDE 300 MG/1
300 CAPSULE ORAL 3 TIMES DAILY
Qty: 30 CAPSULE | Refills: 2 | Status: ON HOLD | OUTPATIENT
Start: 2017-01-01 | End: 2017-01-01

## 2017-01-01 RX ORDER — CHLORTHALIDONE 25 MG/1
TABLET ORAL
Refills: 2 | COMMUNITY
Start: 2017-01-01 | End: 2018-01-01 | Stop reason: DRUGHIGH

## 2017-01-01 RX ORDER — TIZANIDINE 4 MG/1
4 TABLET ORAL 3 TIMES DAILY
Qty: 90 TABLET | Refills: 1 | Status: SHIPPED | OUTPATIENT
Start: 2017-01-01 | End: 2018-01-01 | Stop reason: SDUPTHER

## 2017-01-01 RX ORDER — LISINOPRIL 10 MG/1
2.5 TABLET ORAL DAILY
Refills: 2 | COMMUNITY
Start: 2017-01-01

## 2017-01-01 RX ORDER — HYDROCODONE BITARTRATE AND ACETAMINOPHEN 5; 325 MG/1; MG/1
1 TABLET ORAL EVERY 4 HOURS PRN
Status: DISCONTINUED | OUTPATIENT
Start: 2017-01-01 | End: 2017-01-01

## 2017-01-01 RX ORDER — ESTRADIOL 2 MG/1
TABLET ORAL
Refills: 5 | COMMUNITY
Start: 2017-01-01

## 2017-01-01 RX ORDER — FENTANYL CITRATE 50 UG/ML
INJECTION, SOLUTION INTRAMUSCULAR; INTRAVENOUS AS NEEDED
Status: DISCONTINUED | OUTPATIENT
Start: 2017-01-01 | End: 2017-01-01 | Stop reason: HOSPADM

## 2017-01-01 RX ORDER — DOXAZOSIN MESYLATE 1 MG/1
TABLET ORAL
Refills: 5 | COMMUNITY
Start: 2017-01-01 | End: 2017-01-01

## 2017-01-01 RX ORDER — ONDANSETRON 2 MG/ML
4 INJECTION INTRAMUSCULAR; INTRAVENOUS EVERY 6 HOURS PRN
Status: DISCONTINUED | OUTPATIENT
Start: 2017-01-01 | End: 2017-01-01

## 2017-01-01 RX ORDER — CLINDAMYCIN PHOSPHATE 11.9 MG/ML
SOLUTION TOPICAL
Refills: 1 | COMMUNITY
Start: 2017-01-01

## 2017-01-01 RX ORDER — SULFAMETHOXAZOLE AND TRIMETHOPRIM 800; 160 MG/1; MG/1
1 TABLET ORAL 2 TIMES DAILY
Qty: 20 TABLET | Refills: 0 | Status: SHIPPED | OUTPATIENT
Start: 2017-01-01 | End: 2017-01-01

## 2017-01-01 RX ORDER — DICYCLOMINE HYDROCHLORIDE 10 MG/1
CAPSULE ORAL
Refills: 3 | COMMUNITY
Start: 2017-01-01 | End: 2017-01-01

## 2017-01-01 RX ORDER — PRAMIPEXOLE DIHYDROCHLORIDE 1.5 MG/1
TABLET ORAL
Refills: 5 | COMMUNITY
Start: 2017-01-01

## 2017-01-01 RX ORDER — POTASSIUM CHLORIDE 750 MG/1
10 CAPSULE, EXTENDED RELEASE ORAL 2 TIMES DAILY
Refills: 2 | COMMUNITY
Start: 2017-01-01

## 2017-01-01 RX ORDER — SODIUM CHLORIDE 9 MG/ML
125 INJECTION, SOLUTION INTRAVENOUS CONTINUOUS
Status: DISCONTINUED | OUTPATIENT
Start: 2017-01-01 | End: 2017-01-01 | Stop reason: HOSPADM

## 2017-01-01 RX ORDER — LISINOPRIL 2.5 MG/1
TABLET ORAL
Refills: 2 | COMMUNITY
Start: 2017-01-01

## 2017-01-01 RX ORDER — ONDANSETRON 2 MG/ML
INJECTION INTRAMUSCULAR; INTRAVENOUS AS NEEDED
Status: DISCONTINUED | OUTPATIENT
Start: 2017-01-01 | End: 2017-01-01 | Stop reason: HOSPADM

## 2017-01-01 RX ORDER — HYDROCODONE BITARTRATE AND ACETAMINOPHEN 5; 325 MG/1; MG/1
TABLET ORAL
Refills: 0 | COMMUNITY
Start: 2017-07-18 | End: 2017-01-01 | Stop reason: SDUPTHER

## 2017-01-01 RX ORDER — CEPHALEXIN 500 MG/1
500 CAPSULE ORAL 4 TIMES DAILY
Qty: 28 CAPSULE | Refills: 0 | Status: SHIPPED | OUTPATIENT
Start: 2017-01-01 | End: 2017-01-01

## 2017-01-01 RX ORDER — VANCOMYCIN HYDROCHLORIDE 500 MG/10ML
INJECTION, POWDER, LYOPHILIZED, FOR SOLUTION INTRAVENOUS
COMMUNITY
Start: 2017-01-01 | End: 2017-01-01 | Stop reason: SDUPTHER

## 2017-01-01 RX ORDER — HYDROCODONE BITARTRATE AND ACETAMINOPHEN 7.5; 325 MG/1; MG/1
TABLET ORAL
Refills: 0 | COMMUNITY
Start: 2017-01-01

## 2017-01-01 RX ORDER — MINOCYCLINE HYDROCHLORIDE 100 MG/1
CAPSULE ORAL
Refills: 1 | COMMUNITY
Start: 2017-01-01 | End: 2017-01-01

## 2017-01-01 RX ORDER — CIPROFLOXACIN 500 MG/1
500 TABLET, FILM COATED ORAL DAILY
Qty: 10 TABLET | Refills: 2 | Status: SHIPPED | OUTPATIENT
Start: 2017-01-01 | End: 2017-01-01

## 2017-01-01 RX ORDER — SPIRONOLACTONE 50 MG/1
50 TABLET, FILM COATED ORAL 2 TIMES DAILY
COMMUNITY

## 2017-01-01 RX ADMIN — POTASSIUM CHLORIDE 40 MEQ: 750 CAPSULE, EXTENDED RELEASE ORAL at 10:32

## 2017-01-02 ENCOUNTER — HOSPITAL ENCOUNTER (OUTPATIENT)
Dept: OTHER | Facility: HOSPITAL | Age: 52
Discharge: HOME | End: 2017-01-02
Attending: INTERNAL MEDICINE

## 2017-01-02 LAB
ALBUMIN SERPL-MCNC: 3.7 G/DL (ref 3.5–5)
ALBUMIN/GLOB SERPL: 1.3 {RATIO} (ref 1–2)
ALP SERPL-CCNC: 77 U/L (ref 38–126)
ALT SERPL-CCNC: 34 U/L (ref 13–69)
ANION GAP SERPL CALC-SCNC: 17 MMOL/L (ref 10–20)
AST SERPL-CCNC: 17 U/L (ref 15–46)
BASOPHILS # BLD AUTO: 0.04 THOUS (ref 0–0.2)
BASOPHILS NFR BLD AUTO: 0.4 % (ref 0–2.5)
BILIRUB SERPL-MCNC: 0.3 MG/DL (ref 0.2–1.3)
BUN SERPL-MCNC: 26 MG/DL (ref 7–20)
BUN/CREAT SERPL: 20 (ref 7.1–23.5)
CALCIUM SERPL-MCNC: 9 MG/DL (ref 8.4–10.2)
CHLORIDE SERPL-SCNC: 104 MMOL/L (ref 98–107)
CONV ABS IMM GRAN: 0.04 THOUS (ref 0–0.6)
CONV CO2: 27 MMOL/L (ref 26–30)
CONV IMMATURE GRAN: 0.4 % (ref 0–2.5)
CONV TOTAL PROTEIN: 6.7 G/DL (ref 6.3–8.2)
CREAT UR-MCNC: 1.3 MG/DL (ref 0.6–1.3)
EOSINOPHIL # BLD AUTO: 0.37 THOUS (ref 0–0.7)
EOSINOPHIL # BLD AUTO: 3.5 % (ref 0–7)
ERYTHROCYTE [DISTWIDTH] IN BLOOD BY AUTOMATED COUNT: 16.8 % (ref 11.5–14.5)
GFR SERPL CREATININE-BSD FRML MDRD: 43 ML/MIN
GLUCOSE SERPL-MCNC: 103 MG/DL (ref 74–98)
HCT VFR BLD AUTO: 40 % (ref 37–47)
HGB BLD-MCNC: 12.3 G/DL (ref 12–16)
LYMPHOCYTES # BLD AUTO: 1.93 THOUS (ref 0.6–3.4)
LYMPHOCYTES NFR BLD AUTO: 18.4 % (ref 10–50)
MCH RBC QN AUTO: 28.9 UUG (ref 27–31)
MCHC RBC AUTO-ENTMCNC: 30.9 G/DL (ref 30–37)
MCV RBC AUTO: 93.4 FL (ref 81–99)
MONOCYTES # BLD AUTO: 0.66 THOUS (ref 0–0.9)
MONOCYTES NFR BLD MANUAL: 6.3 % (ref 0–12)
NEUTROPHILS # BLD MANUAL: 7.45 THOUS (ref 2–6.9)
NEUTROPHILS NFR BLD AUTO: 71 % (ref 37–80)
PLATELET # BLD AUTO: 181 THOUS (ref 130–400)
POTASSIUM SERPL-SCNC: 4 MMOL/L (ref 3.5–5.1)
RBC # BLD AUTO: 4.26 M/UL (ref 4.2–5.4)
SODIUM SERPL-SCNC: 144 MMOL/L (ref 137–145)
VANCOMYCIN TROUGH SERPL-MCNC: 12 UG/ML (ref 5–15)
WBC # BLD AUTO: 10.5 THOUS (ref 4.8–10.8)

## 2017-01-10 ENCOUNTER — LAB (OUTPATIENT)
Dept: LAB | Facility: HOSPITAL | Age: 52
End: 2017-01-10

## 2017-01-10 ENCOUNTER — LAB REQUISITION (OUTPATIENT)
Dept: LAB | Facility: HOSPITAL | Age: 52
End: 2017-01-10

## 2017-01-10 DIAGNOSIS — R11.0 NAUSEA: ICD-10-CM

## 2017-01-10 DIAGNOSIS — L03.116 CELLULITIS OF LEFT FOOT: Primary | ICD-10-CM

## 2017-01-10 LAB
ALBUMIN SERPL-MCNC: 4 G/DL (ref 3.2–4.8)
ALBUMIN/GLOB SERPL: 1.5 G/DL (ref 1.5–2.5)
ALP SERPL-CCNC: 84 U/L (ref 25–100)
ALT SERPL W P-5'-P-CCNC: 23 U/L (ref 7–40)
ANION GAP SERPL CALCULATED.3IONS-SCNC: 6 MMOL/L (ref 3–11)
AST SERPL-CCNC: 15 U/L (ref 0–33)
BASOPHILS # BLD AUTO: 0.01 10*3/MM3 (ref 0–0.2)
BASOPHILS NFR BLD AUTO: 0.1 % (ref 0–1)
BILIRUB SERPL-MCNC: 0.3 MG/DL (ref 0.3–1.2)
BUN BLD-MCNC: 28 MG/DL (ref 9–23)
BUN/CREAT SERPL: 23.3 (ref 7–25)
CALCIUM SPEC-SCNC: 9.2 MG/DL (ref 8.7–10.4)
CHLORIDE SERPL-SCNC: 104 MMOL/L (ref 99–109)
CO2 SERPL-SCNC: 34 MMOL/L (ref 20–31)
CREAT BLD-MCNC: 1.2 MG/DL (ref 0.6–1.3)
CRP SERPL-MCNC: 43.1 MG/DL (ref 0–10)
DEPRECATED RDW RBC AUTO: 55.7 FL (ref 37–54)
EOSINOPHIL # BLD AUTO: 0.22 10*3/MM3 (ref 0.1–0.3)
EOSINOPHIL NFR BLD AUTO: 2.4 % (ref 0–3)
ERYTHROCYTE [DISTWIDTH] IN BLOOD BY AUTOMATED COUNT: 16.8 % (ref 11.3–14.5)
ERYTHROCYTE [SEDIMENTATION RATE] IN BLOOD: 30 MM/HR (ref 0–20)
GFR SERPL CREATININE-BSD FRML MDRD: 48 ML/MIN/1.73
GLOBULIN UR ELPH-MCNC: 2.6 GM/DL
GLUCOSE BLD-MCNC: 104 MG/DL (ref 70–100)
HCT VFR BLD AUTO: 37.9 % (ref 34.5–44)
HGB BLD-MCNC: 12.4 G/DL (ref 11.5–15.5)
IMM GRANULOCYTES # BLD: 0.02 10*3/MM3 (ref 0–0.03)
IMM GRANULOCYTES NFR BLD: 0.2 % (ref 0–0.6)
LYMPHOCYTES # BLD AUTO: 1.99 10*3/MM3 (ref 0.6–4.8)
LYMPHOCYTES NFR BLD AUTO: 21.6 % (ref 24–44)
MCH RBC QN AUTO: 29.7 PG (ref 27–31)
MCHC RBC AUTO-ENTMCNC: 32.7 G/DL (ref 32–36)
MCV RBC AUTO: 90.9 FL (ref 80–99)
MONOCYTES # BLD AUTO: 0.53 10*3/MM3 (ref 0–1)
MONOCYTES NFR BLD AUTO: 5.8 % (ref 0–12)
NEUTROPHILS # BLD AUTO: 6.44 10*3/MM3 (ref 1.5–8.3)
NEUTROPHILS NFR BLD AUTO: 69.9 % (ref 41–71)
PLATELET # BLD AUTO: 202 10*3/MM3 (ref 150–450)
PMV BLD AUTO: 10.8 FL (ref 6–12)
POTASSIUM BLD-SCNC: 4 MMOL/L (ref 3.5–5.5)
PROT SERPL-MCNC: 6.6 G/DL (ref 5.7–8.2)
RBC # BLD AUTO: 4.17 10*6/MM3 (ref 3.89–5.14)
SODIUM BLD-SCNC: 144 MMOL/L (ref 132–146)
VANCOMYCIN SERPL-MCNC: 22 MCG/ML (ref 5–40)
WBC NRBC COR # BLD: 9.21 10*3/MM3 (ref 3.5–10.8)

## 2017-01-10 PROCEDURE — 36415 COLL VENOUS BLD VENIPUNCTURE: CPT

## 2017-01-10 PROCEDURE — 85025 COMPLETE CBC W/AUTO DIFF WBC: CPT | Performed by: INTERNAL MEDICINE

## 2017-01-10 PROCEDURE — 80202 ASSAY OF VANCOMYCIN: CPT | Performed by: INTERNAL MEDICINE

## 2017-01-10 PROCEDURE — 86140 C-REACTIVE PROTEIN: CPT | Performed by: INTERNAL MEDICINE

## 2017-01-10 PROCEDURE — 87070 CULTURE OTHR SPECIMN AEROBIC: CPT | Performed by: INTERNAL MEDICINE

## 2017-01-10 PROCEDURE — 85652 RBC SED RATE AUTOMATED: CPT | Performed by: INTERNAL MEDICINE

## 2017-01-10 PROCEDURE — 80053 COMPREHEN METABOLIC PANEL: CPT | Performed by: INTERNAL MEDICINE

## 2017-01-11 VITALS
WEIGHT: 293 LBS | BODY MASS INDEX: 47.09 KG/M2 | HEIGHT: 66 IN | TEMPERATURE: 97.5 F | DIASTOLIC BLOOD PRESSURE: 92 MMHG | HEART RATE: 82 BPM | RESPIRATION RATE: 18 BRPM | SYSTOLIC BLOOD PRESSURE: 181 MMHG

## 2017-01-13 LAB — CATHETER CULTURE: NORMAL

## 2017-02-13 ENCOUNTER — TRANSCRIBE ORDERS (OUTPATIENT)
Dept: ADMINISTRATIVE | Facility: HOSPITAL | Age: 52
End: 2017-02-13

## 2017-02-13 DIAGNOSIS — Z13.9 SCREENING: Primary | ICD-10-CM

## 2017-03-15 ENCOUNTER — APPOINTMENT (OUTPATIENT)
Dept: MAMMOGRAPHY | Facility: HOSPITAL | Age: 52
End: 2017-03-15

## 2017-06-05 ENCOUNTER — OFFICE VISIT (OUTPATIENT)
Dept: OBSTETRICS AND GYNECOLOGY | Facility: CLINIC | Age: 52
End: 2017-06-05

## 2017-06-05 VITALS
SYSTOLIC BLOOD PRESSURE: 126 MMHG | DIASTOLIC BLOOD PRESSURE: 80 MMHG | HEIGHT: 66 IN | WEIGHT: 293 LBS | BODY MASS INDEX: 47.09 KG/M2

## 2017-06-05 DIAGNOSIS — Z01.419 ENCOUNTER FOR GYNECOLOGICAL EXAMINATION WITHOUT ABNORMAL FINDING: Primary | ICD-10-CM

## 2017-06-05 DIAGNOSIS — Z12.72 SPECIAL SCREENING FOR MALIGNANT NEOPLASM OF VAGINA: ICD-10-CM

## 2017-06-05 PROCEDURE — 99396 PREV VISIT EST AGE 40-64: CPT | Performed by: PHYSICIAN ASSISTANT

## 2017-06-05 RX ORDER — ONDANSETRON 4 MG/1
TABLET, ORALLY DISINTEGRATING ORAL EVERY 8 HOURS
Status: ON HOLD | COMMUNITY
End: 2017-01-01

## 2017-06-05 RX ORDER — POTASSIUM CHLORIDE 750 MG/1
TABLET, EXTENDED RELEASE ORAL
Refills: 2 | COMMUNITY
Start: 2017-05-17 | End: 2017-01-01 | Stop reason: SDUPTHER

## 2017-06-05 RX ORDER — CYCLOBENZAPRINE HCL 10 MG
TABLET ORAL 3 TIMES DAILY
COMMUNITY
End: 2017-01-01

## 2017-06-05 RX ORDER — IPRATROPIUM BROMIDE 21 UG/1
SPRAY, METERED NASAL
Refills: 6 | COMMUNITY
Start: 2017-05-11 | End: 2017-01-01

## 2017-06-05 RX ORDER — BUTALBITAL, ACETAMINOPHEN AND CAFFEINE 50; 325; 40 MG/1; MG/1; MG/1
TABLET ORAL EVERY 4 HOURS
Status: ON HOLD | COMMUNITY
End: 2017-01-01

## 2017-06-05 RX ORDER — POLYETHYLENE GLYCOL-3350 AND ELECTROLYTES 236; 6.74; 5.86; 2.97; 22.74 G/274.31G; G/274.31G; G/274.31G; G/274.31G; G/274.31G
POWDER, FOR SOLUTION ORAL
Status: ON HOLD | COMMUNITY
Start: 2017-05-02 | End: 2017-01-01

## 2017-06-05 RX ORDER — FEBUXOSTAT 40 MG/1
40 TABLET ORAL NIGHTLY
Refills: 3 | COMMUNITY
Start: 2017-05-23

## 2017-06-05 NOTE — PROGRESS NOTES
Subjective   Chief Complaint   Patient presents with   • Gynecologic Exam     Margarita Dominguez is a 51 y.o. year old  presenting to be seen for her annual exam.   She has had hysterectomy BSO -she is taking estradiol 2 mg and reports she does not need refills at this time-she desires to continue estradiol-states she tried cutting back to 1 mg but had lot of hot flashes  She reports she has mammogram scheduled within the next month at [facility]  Her last pap and mammogram have been about 3 years ago      Past Medical History:   Diagnosis Date   • Allergic    • Anxiety    • Arthritis    • Asthma    • Bladder disease    • Chest pain syndrome 2016    with normal heart catheterization, 2014: Normal LV function. Normal coronary arteries. Normal pulmonary artery pressure.   • Chronic pain    • COPD (chronic obstructive pulmonary disease)    • Depression    • Diabetes mellitus    • Esophageal spasm    • Fibromyalgia    • Fibromyalgia    • GERD (gastroesophageal reflux disease)    • Heart murmur    • History of chest x-ray 2016    no acute appearing infiltrates she had some old rib fractures in the right    • History of chest x-ray 2016    No acute cardiopulmonary process   • History of chest x-ray 2016    No acute cardiopulmonary process.   • History of chest x-ray 2014    Negative chest for active disease. Heart size is normal. The heart is compensated.   • History of PFTs 2016    Spirometry suggests less than minimal effort since %FVC is < %TLC. moderate airways obstruction there was no significant improvement bronchodilators TLC was within normal limits she could not do diffusion study   • Hyperlipidemia    • Hypertension    • IBS (irritable bowel syndrome)    • IBS (irritable bowel syndrome)    • Joint pain    • Migraines    • Morbid obesity 2016   • Muscle spasm    • Obstructive sleep apnea    • Osteoarthritis    • Peripheral edema    • Restless legs syndrome    •  Rheumatoid arthritis    • Stress incontinence    • Syncope 8/22/2016    onset in summer 2015: “Negative” neurologic evaluation with negative CT and EEG.   • Tobacco use disorder    • Vitamin D deficiency         Current Outpatient Prescriptions:   •  albuterol (PROVENTIL HFA;VENTOLIN HFA) 108 (90 BASE) MCG/ACT inhaler, Inhale 1-2 puffs every 6 (six) hours as needed., Disp: , Rfl:   •  aspirin  MG EC tablet, Take 1 tablet by mouth daily., Disp: , Rfl:   •  butalbital-acetaminophen-caffeine (FIORICET) -40 MG per tablet, Take 1 tablet by mouth 2 (Two) Times a Day As Needed for headaches., Disp: , Rfl:   •  Cholecalciferol (VITAMIN D3) 5000 UNITS capsule capsule, Take 5,000 Units by mouth Daily., Disp: , Rfl:   •  cyclobenzaprine (FLEXERIL) 10 MG tablet, Take  by mouth 3 (Three) Times a Day., Disp: , Rfl:   •  dexlansoprazole (DEXILANT) 60 MG capsule, Take 60 mg by mouth Every Night., Disp: , Rfl:   •  dicyclomine (BENTYL) 20 MG tablet, Take 20 mg by mouth 3 (Three) Times a Day As Needed., Disp: , Rfl:   •  doxazosin (CARDURA) 2 MG tablet, Take 2 mg by mouth 2 (Two) Times a Day., Disp: , Rfl:   •  estradiol (ESTRACE) 1 MG tablet, Take 1 mg by mouth Daily., Disp: , Rfl:   •  furosemide (LASIX) 40 MG tablet, Take 1 tablet by mouth 2 (Two) Times a Day., Disp: 60 tablet, Rfl: 0  •  GAVILYTE-G 236 G solution, , Disp: , Rfl:   •  HYDROcodone-acetaminophen (NORCO) 7.5-325 MG per tablet, Take 1 tablet by mouth. Take every 4-6 hours as neede., Disp: , Rfl:   •  ipratropium (ATROVENT) 0.03 % nasal spray, INSTILL 1 SPRAY IN EACH NOSTRIL BID, Disp: , Rfl: 6  •  levalbuterol (XOPENEX) 1.25 MG/3ML nebulizer solution, Take 1 ampule by nebulization Every 8 (Eight) Hours As Needed for wheezing., Disp: , Rfl:   •  levocetirizine (XYZAL) 5 MG tablet, Take 5 mg by mouth Daily., Disp: , Rfl:   •  lisinopril (PRINIVIL,ZESTRIL) 10 MG tablet, Take 1 tablet by mouth Daily., Disp: 30 tablet, Rfl: 0  •  mometasone-formoterol (DULERA  200) 200-5 MCG/ACT inhaler, Inhale 2 puffs 2 (two) times a day., Disp: , Rfl:   •  montelukast (SINGULAIR) 10 MG tablet, Take 10 mg by mouth Daily., Disp: , Rfl:   •  Multiple Vitamin (MULTI VITAMIN DAILY) tablet, Take 1 tablet by mouth daily., Disp: , Rfl:   •  potassium chloride (K-DUR,KLOR-CON) 10 MEQ CR tablet, TK 1 T PO QD, Disp: , Rfl: 2  •  pramipexole (MIRAPEX) 1.5 MG tablet, Take 1.5 mg by mouth Every Night., Disp: , Rfl:   •  ULORIC 40 MG tablet, , Disp: , Rfl: 3  •  budesonide-formoterol (SYMBICORT) 160-4.5 MCG/ACT inhaler, Inhale 2 puffs 2 (Two) Times a Day., Disp: , Rfl:   •  butalbital-acetaminophen-caffeine (FIORICET, ESGIC) -40 MG per tablet, Take  by mouth Every 4 (Four) Hours., Disp: , Rfl:   •  clonazePAM (KlonoPIN) 0.5 MG tablet, Take 0.5 mg by mouth Every 12 (Twelve) Hours As Needed., Disp: , Rfl:   •  cyclobenzaprine (FLEXERIL) 10 MG tablet, Take 10 mg by mouth 3 (Three) Times a Day As Needed., Disp: , Rfl:   •  Eluxadoline (VIBERZI) 75 MG tablet, Take 75 mg by mouth Daily., Disp: , Rfl:   •  EPINEPHrine (EPIPEN) 0.3 MG/0.3ML solution auto-injector injection, Inject 0.3 mL into the shoulder, thigh, or buttocks., Disp: , Rfl:   •  guaiFENesin (MUCINEX) 600 MG 12 hr tablet, Take 600 mg by mouth 2 (Two) Times a Day., Disp: , Rfl:   •  ibuprofen (ADVIL,MOTRIN) 800 MG tablet, Take 800 mg by mouth Every 8 (Eight) Hours As Needed., Disp: , Rfl:   •  Krill Oil 300 MG capsule, Take 1 capsule by mouth Daily., Disp: , Rfl:   •  lidocaine (LIDODERM) 5 %, Place 1 patch on the skin Daily As Needed. Remove & Discard patch within 12 hours or as directed by MD , Disp: , Rfl:   •  metFORMIN XR (GLUCOPHAGE-XR) 500 MG 24 hr tablet, Take 500 mg by mouth 2 (Two) Times a Day., Disp: , Rfl:   •  nitroglycerin (NITROSTAT) 0.4 MG SL tablet, Place 0.4 mg under the tongue Every 5 (Five) Minutes As Needed., Disp: , Rfl:   •  ondansetron (ZOFRAN) 4 MG tablet, Take 4 mg by mouth Every 8 (Eight) Hours As Needed., Disp:  , Rfl:   •  ondansetron ODT (ZOFRAN-ODT) 4 MG disintegrating tablet, Take  by mouth Every 8 (Eight) Hours., Disp: , Rfl:   •  ranitidine (ZANTAC) 300 MG tablet, Take 300 mg by mouth Daily., Disp: , Rfl:   •  Tiotropium Bromide Monohydrate (SPIRIVA RESPIMAT) 2.5 MCG/ACT aerosol solution, Inhale 1 puff 2 (Two) Times a Day., Disp: , Rfl:    Allergies   Allergen Reactions   • Morphine    • Morphine And Related    • Adhesive Tape Rash     Bandaging tape      Past Surgical History:   Procedure Laterality Date   • APPENDECTOMY     • BACK SURGERY     • CARDIAC CATHETERIZATION     • CARDIAC SURGERY     • COLONOSCOPY     • HYSTERECTOMY     • KNEE ARTHROSCOPY Right 2015   • MOUTH SURGERY     • TONSILLECTOMY        Social History     Social History   • Marital status:      Spouse name: N/A   • Number of children: N/A   • Years of education: N/A     Occupational History   • Not on file.     Social History Main Topics   • Smoking status: Current Some Day Smoker     Packs/day: 1.00     Years: 30.00   • Smokeless tobacco: Never Used   • Alcohol use Yes      Comment: socially   • Drug use: No   • Sexual activity: Defer     Other Topics Concern   • Not on file     Social History Narrative      Family History   Problem Relation Age of Onset   • Heart attack Maternal Grandmother    • Hypertension Maternal Grandmother    • Heart attack Maternal Grandfather    • Hypertension Maternal Grandfather    • Hypertension Paternal Grandmother    • Obesity Paternal Grandmother    • Heart attack Other      Paternal history   • Hypertension Other      Paternal & Maternal history   • Diabetes Other      Maternal history   • Hypertension Mother    • Diabetes Mother    • Heart attack Mother    • Hypertension Father    • Heart attack Father    • Obesity Father    • Sleep apnea Father    • Diabetes Father        The following portions of the patient's history were reviewed and updated as appropriate:problem list, current medications, allergies,  "past family history, past medical history, past social history and past surgical history.    Review of Systems   Constitutional:        Weight gain   HENT: Positive for sinus pressure and tinnitus.    Respiratory: Positive for wheezing.    Gastrointestinal: Positive for diarrhea.   Endocrine: Positive for polydipsia.        Hot flashes   Genitourinary: Positive for enuresis and frequency.   Psychiatric/Behavioral: Positive for sleep disturbance.           Objective   /80  Ht 66\" (167.6 cm)  Wt (!) 354 lb (161 kg)  BMI 57.14 kg/m2    Physical Exam   Constitutional: She appears well-developed and well-nourished. She is cooperative.   Pulmonary/Chest: Right breast exhibits no inverted nipple, no mass, no nipple discharge, no skin change and no tenderness. Left breast exhibits no inverted nipple, no mass, no nipple discharge, no skin change and no tenderness.   Abdominal: Soft. There is no hepatosplenomegaly. There is no tenderness.   Genitourinary: Vagina normal. There is no tenderness or lesion on the right labia. There is no tenderness or lesion on the left labia. Right adnexum displays no mass and no tenderness. Left adnexum displays no mass and no tenderness.   Genitourinary Comments: Cervix and uterus absent   Neurological: She is alert.   Skin: Skin is warm and dry.   Psychiatric: She has a normal mood and affect. Her behavior is normal.            Assessment /Plan      Margarita was seen today for gynecologic exam.    Diagnoses and all orders for this visit:    Encounter for gynecological examination without abnormal finding    Special screening for malignant neoplasm of vagina  -     Liquid-based Pap Smear, Screening; Future               This note was electronically signed.    Miriam Miles PA-C   June 5, 2017  "

## 2017-06-14 DIAGNOSIS — Z12.72 SPECIAL SCREENING FOR MALIGNANT NEOPLASM OF VAGINA: ICD-10-CM

## 2017-07-12 ENCOUNTER — OFFICE VISIT (OUTPATIENT)
Dept: SURGERY | Age: 52
End: 2017-07-12
Payer: MEDICAID

## 2017-07-12 ENCOUNTER — HOSPITAL ENCOUNTER (OUTPATIENT)
Dept: OTHER | Age: 52
Discharge: HOME | End: 2017-07-12
Attending: SURGERY | Admitting: SURGERY

## 2017-07-12 VITALS
HEIGHT: 66 IN | RESPIRATION RATE: 16 BRPM | WEIGHT: 293 LBS | BODY MASS INDEX: 47.09 KG/M2 | SYSTOLIC BLOOD PRESSURE: 117 MMHG | HEART RATE: 93 BPM | TEMPERATURE: 97.5 F | DIASTOLIC BLOOD PRESSURE: 70 MMHG

## 2017-07-12 DIAGNOSIS — D17.1 LIPOMA OF BACK: Primary | ICD-10-CM

## 2017-07-12 PROCEDURE — 99213 OFFICE O/P EST LOW 20 MIN: CPT | Performed by: SURGERY

## 2017-07-12 RX ORDER — LEVALBUTEROL INHALATION SOLUTION 1.25 MG/3ML
1 SOLUTION RESPIRATORY (INHALATION)
COMMUNITY
End: 2017-10-27 | Stop reason: ALTCHOICE

## 2017-07-12 RX ORDER — NITROGLYCERIN 0.4 MG/1
0.4 TABLET SUBLINGUAL EVERY 5 MIN PRN
COMMUNITY

## 2017-07-12 RX ORDER — DICYCLOMINE HYDROCHLORIDE 10 MG/1
10 CAPSULE ORAL 3 TIMES DAILY
COMMUNITY
End: 2017-10-27 | Stop reason: ALTCHOICE

## 2017-07-12 RX ORDER — DIPHENHYDRAMINE HYDROCHLORIDE 25 MG/1
5 TABLET ORAL 2 TIMES DAILY
COMMUNITY

## 2017-07-12 RX ORDER — FLUCONAZOLE 200 MG/1
200 TABLET ORAL DAILY
COMMUNITY

## 2017-07-12 RX ORDER — DEXLANSOPRAZOLE 60 MG/1
60 CAPSULE, DELAYED RELEASE ORAL DAILY
COMMUNITY

## 2017-07-12 RX ORDER — PREGABALIN 75 MG/1
75 CAPSULE ORAL 2 TIMES DAILY
COMMUNITY
End: 2017-10-27 | Stop reason: ALTCHOICE

## 2017-07-12 RX ORDER — FEBUXOSTAT 40 MG/1
60 TABLET, FILM COATED ORAL DAILY
COMMUNITY

## 2017-07-12 RX ORDER — HYDROCODONE BITARTRATE AND ACETAMINOPHEN 5; 325 MG/1; MG/1
7.5 TABLET ORAL EVERY 8 HOURS PRN
COMMUNITY

## 2017-07-12 RX ORDER — MONTELUKAST SODIUM 4 MG/1
1 TABLET, CHEWABLE ORAL 2 TIMES DAILY
COMMUNITY
End: 2017-10-27 | Stop reason: ALTCHOICE

## 2017-07-12 RX ORDER — DOXAZOSIN MESYLATE 1 MG/1
1 TABLET ORAL 2 TIMES DAILY
COMMUNITY
End: 2017-10-27 | Stop reason: ALTCHOICE

## 2017-07-13 ENCOUNTER — LAB REQUISITION (OUTPATIENT)
Dept: LAB | Facility: HOSPITAL | Age: 52
End: 2017-07-13

## 2017-07-13 DIAGNOSIS — M79.3 PANNICULITIS: ICD-10-CM

## 2017-07-13 PROCEDURE — 87205 SMEAR GRAM STAIN: CPT | Performed by: INTERNAL MEDICINE

## 2017-07-13 PROCEDURE — 87077 CULTURE AEROBIC IDENTIFY: CPT | Performed by: INTERNAL MEDICINE

## 2017-07-13 PROCEDURE — 87186 SC STD MICRODIL/AGAR DIL: CPT | Performed by: INTERNAL MEDICINE

## 2017-07-13 PROCEDURE — 87070 CULTURE OTHR SPECIMN AEROBIC: CPT | Performed by: INTERNAL MEDICINE

## 2017-07-17 LAB
BACTERIA SPEC AEROBE CULT: ABNORMAL
BACTERIA SPEC AEROBE CULT: ABNORMAL
GRAM STN SPEC: ABNORMAL

## 2017-07-19 ENCOUNTER — PROCEDURE VISIT (OUTPATIENT)
Dept: SURGERY | Age: 52
End: 2017-07-19
Payer: MEDICAID

## 2017-07-19 ENCOUNTER — HOSPITAL ENCOUNTER (OUTPATIENT)
Dept: OTHER | Age: 52
Discharge: HOME | End: 2017-07-19
Attending: SURGERY | Admitting: SURGERY

## 2017-07-19 VITALS
BODY MASS INDEX: 47.09 KG/M2 | DIASTOLIC BLOOD PRESSURE: 112 MMHG | TEMPERATURE: 97.7 F | WEIGHT: 293 LBS | HEART RATE: 116 BPM | HEIGHT: 66 IN | SYSTOLIC BLOOD PRESSURE: 146 MMHG | RESPIRATION RATE: 16 BRPM

## 2017-07-19 DIAGNOSIS — D17.1 LIPOMA OF BACK: Primary | ICD-10-CM

## 2017-07-19 PROCEDURE — 21931 EXC BACK LES SC 3 CM/>: CPT | Performed by: SURGERY

## 2017-07-19 PROCEDURE — 99999 PR OFFICE/OUTPT VISIT,PROCEDURE ONLY: CPT | Performed by: SURGERY

## 2017-07-19 RX ORDER — METOLAZONE 2.5 MG/1
2.5 TABLET ORAL DAILY
COMMUNITY
End: 2017-10-27 | Stop reason: ALTCHOICE

## 2017-07-20 ENCOUNTER — TRANSCRIBE ORDERS (OUTPATIENT)
Dept: GENERAL RADIOLOGY | Facility: HOSPITAL | Age: 52
End: 2017-07-20

## 2017-07-20 ENCOUNTER — HOSPITAL ENCOUNTER (OUTPATIENT)
Dept: INFUSION THERAPY | Facility: HOSPITAL | Age: 52
Setting detail: INFUSION SERIES
Discharge: HOME | End: 2017-07-20

## 2017-07-20 ENCOUNTER — HOSPITAL ENCOUNTER (OUTPATIENT)
Dept: GENERAL RADIOLOGY | Facility: HOSPITAL | Age: 52
Discharge: HOME | End: 2017-07-20
Admitting: NURSE PRACTITIONER

## 2017-07-20 VITALS
WEIGHT: 293 LBS | DIASTOLIC BLOOD PRESSURE: 72 MMHG | BODY MASS INDEX: 47.09 KG/M2 | OXYGEN SATURATION: 94 % | HEART RATE: 117 BPM | SYSTOLIC BLOOD PRESSURE: 159 MMHG | RESPIRATION RATE: 18 BRPM | HEIGHT: 66 IN | TEMPERATURE: 97.3 F

## 2017-07-20 DIAGNOSIS — R06.02 SOB (SHORTNESS OF BREATH): ICD-10-CM

## 2017-07-20 DIAGNOSIS — R06.02 SOB (SHORTNESS OF BREATH): Primary | ICD-10-CM

## 2017-07-20 DIAGNOSIS — L03.311 CELLULITIS OF ABDOMINAL WALL: ICD-10-CM

## 2017-07-20 PROCEDURE — 71020 HC CHEST PA AND LATERAL: CPT

## 2017-07-20 PROCEDURE — 96365 THER/PROPH/DIAG IV INF INIT: CPT

## 2017-07-20 PROCEDURE — 25010000002 ERTAPENEM PER 500 MG: Performed by: INTERNAL MEDICINE

## 2017-07-20 RX ORDER — SODIUM CHLORIDE 9 MG/ML
250 INJECTION, SOLUTION INTRAVENOUS ONCE
Status: DISCONTINUED | OUTPATIENT
Start: 2017-07-20 | End: 2017-07-22 | Stop reason: HOSPADM

## 2017-07-20 RX ORDER — SODIUM CHLORIDE 9 MG/ML
250 INJECTION, SOLUTION INTRAVENOUS ONCE
Status: CANCELLED | OUTPATIENT
Start: 2017-07-20

## 2017-07-20 RX ADMIN — SODIUM CHLORIDE 1 G: 9 INJECTION, SOLUTION INTRAVENOUS at 09:47

## 2017-07-28 ENCOUNTER — OFFICE VISIT (OUTPATIENT)
Dept: SURGERY | Age: 52
End: 2017-07-28

## 2017-07-28 ENCOUNTER — HOSPITAL ENCOUNTER (OUTPATIENT)
Dept: OTHER | Age: 52
Discharge: HOME | End: 2017-07-28
Attending: SURGERY | Admitting: SURGERY

## 2017-07-28 VITALS
HEART RATE: 95 BPM | WEIGHT: 293 LBS | HEIGHT: 66 IN | TEMPERATURE: 98.4 F | DIASTOLIC BLOOD PRESSURE: 81 MMHG | SYSTOLIC BLOOD PRESSURE: 126 MMHG | BODY MASS INDEX: 47.09 KG/M2 | RESPIRATION RATE: 16 BRPM

## 2017-07-28 DIAGNOSIS — D17.1 LIPOMA OF BACK: Primary | ICD-10-CM

## 2017-07-28 PROCEDURE — 99024 POSTOP FOLLOW-UP VISIT: CPT | Performed by: SURGERY

## 2017-08-11 PROBLEM — I87.2 VENOUS INSUFFICIENCY (CHRONIC) (PERIPHERAL): Status: ACTIVE | Noted: 2017-01-01

## 2017-08-18 NOTE — DISCHARGE INSTR - ACTIVITY
Stop taking Metolazone   Decrease lasix to once a day  Stop Potassium after 4 days  Do blood work in 1 week

## 2017-08-19 NOTE — ED PROVIDER NOTES
Subjective   HPI Comments: Margarita Dominguez is a 51 year old woman who presents to the ED with c/o a nonfunctioning PICC line. She states that her PICC line would not flush earlier today, which prompted presentation to ED. She notes of chronic congestion due to allergies but denies any N/V/D, fever, chills, cough or any other complaints at this time. Her complaints have since resolved.         Patient is a 51 y.o. female presenting with general illness.   History provided by:  Patient  Illness   Location:  PICC Line  Quality:  Occulsion   Severity:  Mild  Timing:  Constant  Progression:  Resolved  Chronicity:  New  Associated symptoms: congestion (Chronic)    Associated symptoms: no cough, no diarrhea, no fever, no nausea and no vomiting        Review of Systems   Constitutional: Negative for chills and fever.   HENT: Positive for congestion (Chronic).    Respiratory: Negative for cough.    Gastrointestinal: Negative for diarrhea, nausea and vomiting.   All other systems reviewed and are negative.      Past Medical History:   Diagnosis Date   • Allergic    • Anxiety    • Arthritis    • Asthma    • Back pain 2015   • Bladder disease    • Chest pain syndrome 8/22/2016    with normal heart catheterization, December 2014: Normal LV function. Normal coronary arteries. Normal pulmonary artery pressure.   • Chronic pain    • COPD (chronic obstructive pulmonary disease)    • Depression    • Diabetes mellitus 2012   • Esophageal spasm    • Fibromyalgia    • Fibromyalgia    • GERD (gastroesophageal reflux disease)    • Heart murmur    • History of chest x-ray 03/17/2016    no acute appearing infiltrates she had some old rib fractures in the right    • History of chest x-ray 02/08/2016    No acute cardiopulmonary process   • History of chest x-ray 01/04/2016    No acute cardiopulmonary process.   • History of chest x-ray 12/18/2014    Negative chest for active disease. Heart size is normal. The heart is compensated.   •  History of PFTs 03/17/2016    Spirometry suggests less than minimal effort since %FVC is < %TLC. moderate airways obstruction there was no significant improvement bronchodilators TLC was within normal limits she could not do diffusion study   • Hyperlipidemia    • Hypertension    • IBS (irritable bowel syndrome)    • IBS (irritable bowel syndrome)    • Joint pain    • Migraines    • Morbid obesity 8/22/2016   • Muscle spasm    • Obstructive sleep apnea    • Osteoarthritis    • Peripheral edema    • Restless legs syndrome    • Rheumatoid arthritis    • Sleep apnea 2013   • Stress incontinence    • Syncope 8/22/2016    onset in summer 2015: “Negative” neurologic evaluation with negative CT and EEG.   • Tattoo    • Tobacco use disorder    • Vertigo 2015   • Vitamin D deficiency        Allergies   Allergen Reactions   • Morphine Shortness Of Breath   • Morphine And Related    • Adhesive Tape Rash     Bandaging tape       Past Surgical History:   Procedure Laterality Date   • APPENDECTOMY  1976   • BACK SURGERY  2015   • CARDIAC CATHETERIZATION  2014   • CARDIAC SURGERY     • COLONOSCOPY  2017   • HYSTERECTOMY  2011   • KNEE ARTHROSCOPY Right 2015   • MOUTH SURGERY     • TONSILLECTOMY  1970       Family History   Problem Relation Age of Onset   • Heart attack Maternal Grandmother    • Hypertension Maternal Grandmother    • Heart attack Maternal Grandfather    • Hypertension Maternal Grandfather    • Hypertension Paternal Grandmother    • Obesity Paternal Grandmother    • Heart attack Other      Paternal history   • Hypertension Other      Paternal & Maternal history   • Diabetes Other      Maternal history   • Hypertension Mother    • Diabetes Mother    • Heart attack Mother    • Hypertension Father    • Heart attack Father    • Obesity Father    • Sleep apnea Father    • Diabetes Father        Social History     Social History   • Marital status:      Spouse name: Leon   • Number of children: N/A   • Years of  education: N/A     Social History Main Topics   • Smoking status: Current Some Day Smoker     Packs/day: 1.00     Years: 30.00   • Smokeless tobacco: Never Used   • Alcohol use Yes      Comment: socially   • Drug use: No   • Sexual activity: Defer     Other Topics Concern   • Not on file     Social History Narrative         Objective   Physical Exam   Constitutional: She is oriented to person, place, and time. She appears well-developed and well-nourished. No distress.   HENT:   Head: Normocephalic and atraumatic.   Eyes: Conjunctivae are normal.   Neck: Normal range of motion. Neck supple.   Cardiovascular: Normal rate, regular rhythm and normal heart sounds.    Pulmonary/Chest: Effort normal and breath sounds normal. No respiratory distress.   Abdominal: Soft. Bowel sounds are normal. There is no tenderness.   Mild soreness of anterior pannus.    Musculoskeletal: Normal range of motion.   Bandaged area on lower RLE   Neurological: She is alert and oriented to person, place, and time.   Skin: Skin is warm and dry. There is erythema (Mild erythema surrounding bandaged area on RLE  ).   Nursing note and vitals reviewed.      Procedures         ED Course  ED Course   Comment By Time   I evaluated the patient and she reports that she was just here for her occluded PICC line.  She has no other acute symptoms.  The nurse flushed her PICC line in a work well.  He followed up with 2 additional flushes on a subsequent visit into the room with no difficulty with flushing.I discussed immediate return if she has any further symptoms, as she certainly needs her IV antibiotics.  She has primary care follow-up.  She had a vascular procedure today and has asked her follow-up as well.  I discussed return to the ED if she has acute symptomsVery pleasant and responsible patient verbalizes understanding agreement with the plan of care. Edy Salas MD 08/18 0082     Recent Results (from the past 24 hour(s))   Comprehensive  Metabolic Panel    Collection Time: 08/18/17  5:58 AM   Result Value Ref Range    Glucose 150 (H) 74 - 98 mg/dL    BUN 42 (H) 7 - 20 mg/dL    Creatinine 1.40 (H) 0.60 - 1.30 mg/dL    Sodium 142 137 - 145 mmol/L    Potassium 2.8 (C) 3.5 - 5.1 mmol/L    Chloride 96 (L) 98 - 107 mmol/L    CO2 35.0 (H) 26.0 - 30.0 mmol/L    Calcium 9.1 8.4 - 10.2 mg/dL    Total Protein 7.0 6.3 - 8.2 g/dL    Albumin 3.80 3.50 - 5.00 g/dL    ALT (SGPT) 37 13 - 69 U/L    AST (SGOT) 19 15 - 46 U/L    Alkaline Phosphatase 91 38 - 126 U/L    Total Bilirubin 0.4 0.2 - 1.3 mg/dL    eGFR Non African Amer 40 (L) >60 mL/min/1.73    Globulin 3.2 gm/dL    A/G Ratio 1.2 1.0 - 2.0 g/dL    BUN/Creatinine Ratio 30.0 (H) 7.1 - 23.5    Anion Gap 13.8 mmol/L   Protime-INR    Collection Time: 08/18/17  5:58 AM   Result Value Ref Range    Protime 12.9 (H) 9.3 - 12.1 Seconds    INR 1.18 (H) 0.90 - 1.10   aPTT    Collection Time: 08/18/17  5:58 AM   Result Value Ref Range    PTT 28.8 25.0 - 36.0 seconds   CBC Auto Differential    Collection Time: 08/18/17  5:58 AM   Result Value Ref Range    WBC 11.10 (H) 4.80 - 10.80 10*3/mm3    RBC 4.41 4.20 - 5.40 10*6/mm3    Hemoglobin 12.1 12.0 - 16.0 g/dL    Hematocrit 38.6 37.0 - 47.0 %    MCV 87.5 81.0 - 99.0 fL    MCH 27.4 27.0 - 31.0 pg    MCHC 31.3 30.0 - 37.0 g/dL    RDW 15.3 (H) 11.5 - 14.5 %    RDW-SD 48.9 37.0 - 54.0 fl    MPV 11.0 6.0 - 12.0 fL    Platelets 199 130 - 400 10*3/mm3    Neutrophil % 68.8 37.0 - 80.0 %    Lymphocyte % 20.3 10.0 - 50.0 %    Monocyte % 7.0 0.0 - 12.0 %    Eosinophil % 2.7 0.0 - 7.0 %    Basophil % 0.5 0.0 - 2.5 %    Immature Grans % 0.7 (H) 0.0 - 0.6 %    Neutrophils, Absolute 7.63 (H) 2.00 - 6.90 10*3/mm3    Lymphocytes, Absolute 2.25 0.60 - 3.40 10*3/mm3    Monocytes, Absolute 0.78 0.00 - 0.90 10*3/mm3    Eosinophils, Absolute 0.30 0.00 - 0.70 10*3/mm3    Basophils, Absolute 0.06 0.00 - 0.20 10*3/mm3    Immature Grans, Absolute 0.08 (H) 0.00 - 0.06 10*3/mm3    nRBC 0.0 0.0 - 0.0  "/100 WBC     Note: In addition to lab results from this visit, the labs listed above may include labs taken at another facility or during a different encounter within the last 24 hours. Please correlate lab times with ED admission and discharge times for further clarification of the services performed during this visit.    No orders to display     Vitals:    08/18/17 2053 08/18/17 2058   BP: 137/86    Pulse: 95    Resp: 20    Temp: 97.7 °F (36.5 °C)    SpO2: 94%    Weight:  (!) 356 lb (161 kg)   Height:  66\" (167.6 cm)     Medications - No data to display  ECG/EMG Results (last 24 hours)     ** No results found for the last 24 hours. **                          MDM    Final diagnoses:   Occluded PICC line, initial encounter   Cellulitis of lower extremity, unspecified laterality       Documentation assistance provided by kyle Hooper.  Information recorded by the kyle was done at my direction and has been verified and validated by me.     Miriam Hooper  08/18/17 2300       Miriam Hooper  08/18/17 2302       Edy Salas MD  08/18/17 2311    "

## 2017-08-19 NOTE — DISCHARGE INSTRUCTIONS
Rest and continue therapy.    Continue follow up with Dr. Waldrop in the office, as well as follow-up with your vascular physicians and primary care doctor as previously arranged    Continue your IV antibiotics, and other medications as previously ordered    Return to the ED if symptoms worsen.

## 2017-09-06 NOTE — ED PROVIDER NOTES
"Subjective   HPI Comments: Margarita Dominguez is a 52 y.o. female with a hx of cellulitis who presents to the ED with c/o of a wound on her RLE. She reports that she has had the wound for about a month and half, however it has been growing in size and worsening since onset. For the past three days she noticed that it had worsened significantly and also notes of an increase in drainage, shooting pains, and she states that the area turned green. The patient also complains of some \"low grade fevers\" of about 99.8 which she notes is very uncommon for her, and some nausea. She has no other complaints at this point. She does have a hx of similar injuries and she is currently on antibiotics and utilizing an unna boot for her sx.       Patient is a 52 y.o. female presenting with general illness.   History provided by:  Patient  Illness   Location:  RLE  Severity:  Moderate  Onset quality:  Gradual  Duration: Onset about a month and a half ago.  Timing:  Constant  Progression:  Worsening  Chronicity:  Recurrent  Context:  The patient has a wound on her RLE with associated shooting pains, swelling, and drainage in the area  Relieved by:  Nothing  Worsened by:  Nothing  Ineffective treatments:  Currently using antibiotics and an unna boot  Associated symptoms: fever (Subjective low grade fever) and nausea        Review of Systems   Constitutional: Positive for fever (Subjective low grade fever). Negative for chills.   Cardiovascular: Positive for leg swelling.   Gastrointestinal: Positive for nausea.   Skin: Positive for wound (RLE).   All other systems reviewed and are negative.      Past Medical History:   Diagnosis Date   • Allergic    • Anxiety    • Arthritis    • Asthma    • Back pain 2015   • Bladder disease    • Chest pain syndrome 8/22/2016    with normal heart catheterization, December 2014: Normal LV function. Normal coronary arteries. Normal pulmonary artery pressure.   • Chronic pain    • COPD (chronic " obstructive pulmonary disease)    • Depression    • Diabetes mellitus 2012   • Esophageal spasm    • Fibromyalgia    • Fibromyalgia    • GERD (gastroesophageal reflux disease)    • Heart murmur    • History of chest x-ray 03/17/2016    no acute appearing infiltrates she had some old rib fractures in the right    • History of chest x-ray 02/08/2016    No acute cardiopulmonary process   • History of chest x-ray 01/04/2016    No acute cardiopulmonary process.   • History of chest x-ray 12/18/2014    Negative chest for active disease. Heart size is normal. The heart is compensated.   • History of PFTs 03/17/2016    Spirometry suggests less than minimal effort since %FVC is < %TLC. moderate airways obstruction there was no significant improvement bronchodilators TLC was within normal limits she could not do diffusion study   • Hyperlipidemia    • Hypertension    • IBS (irritable bowel syndrome)    • IBS (irritable bowel syndrome)    • Joint pain    • Migraines    • Morbid obesity 8/22/2016   • Muscle spasm    • Obstructive sleep apnea    • Osteoarthritis    • Peripheral edema    • Restless legs syndrome    • Rheumatoid arthritis    • Sleep apnea 2013   • Stress incontinence    • Syncope 8/22/2016    onset in summer 2015: “Negative” neurologic evaluation with negative CT and EEG.   • Tattoo    • Tobacco use disorder    • Vertigo 2015   • Vitamin D deficiency        Allergies   Allergen Reactions   • Morphine Shortness Of Breath   • Morphine And Related    • Adhesive Tape Rash     Bandaging tape       Past Surgical History:   Procedure Laterality Date   • APPENDECTOMY  1976   • BACK SURGERY  2015   • CARDIAC CATHETERIZATION  2014   • CARDIAC SURGERY     • COLONOSCOPY  2017   • HYSTERECTOMY  2011   • INTERVENTIONAL RADIOLOGY PROCEDURE Bilateral 8/18/2017    Procedure: IR venogram lower extremity bilateral;  Surgeon: Hayden Romo MD;  Location: [facility] INVASIVE LOCATION;  Service:    • INTERVENTIONAL RADIOLOGY  PROCEDURE N/A 8/18/2017    Procedure: IR intravascular ultrasound each additional non coronary vessel;  Surgeon: Hayden Romo MD;  Location: [facility] INVASIVE LOCATION;  Service:    • KNEE ARTHROSCOPY Right 2015   • MOUTH SURGERY     • TONSILLECTOMY  1970       Family History   Problem Relation Age of Onset   • Heart attack Maternal Grandmother    • Hypertension Maternal Grandmother    • Heart attack Maternal Grandfather    • Hypertension Maternal Grandfather    • Hypertension Paternal Grandmother    • Obesity Paternal Grandmother    • Heart attack Other      Paternal history   • Hypertension Other      Paternal & Maternal history   • Diabetes Other      Maternal history   • Hypertension Mother    • Diabetes Mother    • Heart attack Mother    • Hypertension Father    • Heart attack Father    • Obesity Father    • Sleep apnea Father    • Diabetes Father        Social History     Social History   • Marital status:      Spouse name: Leon   • Number of children: N/A   • Years of education: N/A     Social History Main Topics   • Smoking status: Current Some Day Smoker     Packs/day: 1.00     Years: 30.00   • Smokeless tobacco: Never Used   • Alcohol use Yes      Comment: socially   • Drug use: No   • Sexual activity: Defer     Other Topics Concern   • None     Social History Narrative         Objective   Physical Exam   Constitutional: She is oriented to person, place, and time. She appears well-developed and well-nourished. No distress.   HENT:   Head: Normocephalic and atraumatic.   Eyes: Conjunctivae are normal. No scleral icterus.   Neck: Normal range of motion. Neck supple.   Pulmonary/Chest: Effort normal. No respiratory distress. She has wheezes (Mild expiratory wheezes).   Abdominal: Soft.   Musculoskeletal: Normal range of motion. She exhibits edema.   Extensive edema to both legs,with chronic venous stasis changes. Erythema to dorsum of right foot and distal right lower leg. No warmth,  induration, or tenderness under erythema. There is an approximately 5 X 8 cm raised plaque like lesion in the anterior right lower leg that is focally tender. No purulent drainage is present and the right lower leg is weakened.     Neurological: She is alert and oriented to person, place, and time.   Skin: Skin is warm. There is erythema.   Psychiatric: She has a normal mood and affect. Her behavior is normal.   Nursing note and vitals reviewed.      Procedures         ED Course  ED Course                     MDM    Final diagnoses:   Cellulitis of right leg       Documentation assistance provided by kyle Tirado.  Information recorded by the kyle was done at my direction and has been verified and validated by me.     Roque Tirado  09/06/17 [id number]       Liam Walker MD  09/06/17 [id number]

## 2017-09-08 NOTE — PROGRESS NOTES
Subjective   Patient ID: Margarita Dominguez is a 52 y.o. female   Cellulitis of the Right Lower Leg  She comes in today with what sounds like a fairly long-standing history of wounds on her legs secondary to venous stasis and swelling.  She's had the new wound that's on her right leg now started out as a bunch of small blisters and then they all ruptured and turn into this large wound.  She complains of pain.  She says she's been doing Unna boots religiously by home health and typically changing them 3 times a week based off her swelling.  She's been on multiple antibiotics.  She has multiple medical comorbidities and a fairly significant medication list.  She is morbidly obese.    History of Present Illness    Pain Score: 8  Pain Location: Leg  Pain Orientation: Right     Pain Descriptors: Aching, Burning, Stabbing, Throbbing, Radiating  Pain Frequency: Constant/continuous  Pain Onset: Gradual  Date Pain First Started:  (July 2017)  Clinical Progression: Gradually worsening           Pain Intervention(s): Cold applied, Medication (See MAR)  Result of Injury: No  Work-Related Injury: No    Review of Systems   Constitutional: Positive for fever. Negative for diaphoresis and unexpected weight change.   HENT: Negative for dental problem and sore throat.    Eyes: Negative for visual disturbance.   Respiratory: Positive for shortness of breath.    Cardiovascular: Negative for chest pain.   Gastrointestinal: Negative for abdominal pain, constipation, diarrhea, nausea and vomiting.   Genitourinary: Negative for difficulty urinating and frequency.   Musculoskeletal: Positive for arthralgias.   Neurological: Positive for headaches.   Hematological: Does not bruise/bleed easily.   All other systems reviewed and are negative.      Past Medical History:   Diagnosis Date   • Allergic    • Anxiety    • Arthritis    • Asthma    • Back pain 2015   • Bladder disease    • Chest pain syndrome 8/22/2016    with normal heart  catheterization, December 2014: Normal LV function. Normal coronary arteries. Normal pulmonary artery pressure.   • Chronic pain    • COPD (chronic obstructive pulmonary disease)    • Depression    • Diabetes mellitus 2012   • Esophageal spasm    • Fibromyalgia    • Fibromyalgia    • GERD (gastroesophageal reflux disease)    • Heart murmur    • History of chest x-ray 03/17/2016    no acute appearing infiltrates she had some old rib fractures in the right    • History of chest x-ray 02/08/2016    No acute cardiopulmonary process   • History of chest x-ray 01/04/2016    No acute cardiopulmonary process.   • History of chest x-ray 12/18/2014    Negative chest for active disease. Heart size is normal. The heart is compensated.   • History of PFTs 03/17/2016    Spirometry suggests less than minimal effort since %FVC is < %TLC. moderate airways obstruction there was no significant improvement bronchodilators TLC was within normal limits she could not do diffusion study   • Hyperlipidemia    • Hypertension    • IBS (irritable bowel syndrome)    • IBS (irritable bowel syndrome)    • Joint pain    • Migraines    • Morbid obesity 8/22/2016   • Muscle spasm    • Obstructive sleep apnea    • Osteoarthritis    • Peripheral edema    • Restless legs syndrome    • Rheumatoid arthritis    • Sleep apnea 2013   • Stress incontinence    • Syncope 8/22/2016    onset in summer 2015: “Negative” neurologic evaluation with negative CT and EEG.   • Tattoo    • Tobacco use disorder    • Vertigo 2015   • Vitamin D deficiency         Past Surgical History:   Procedure Laterality Date   • APPENDECTOMY  1976   • BACK SURGERY  2015   • CARDIAC CATHETERIZATION  2014   • CARDIAC SURGERY     • COLONOSCOPY  2017   • HYSTERECTOMY  2011   • INTERVENTIONAL RADIOLOGY PROCEDURE Bilateral 8/18/2017    Procedure: IR venogram lower extremity bilateral;  Surgeon: Hayden Romo MD;  Location: [facility] CATH INVASIVE LOCATION;  Service:    •  INTERVENTIONAL RADIOLOGY PROCEDURE N/A 8/18/2017    Procedure: IR intravascular ultrasound each additional non coronary vessel;  Surgeon: Hayden Romo MD;  Location: [facility] INVASIVE LOCATION;  Service:    • KNEE ARTHROSCOPY Right 2015   • MOUTH SURGERY     • TONSILLECTOMY  1970       Allergies   Allergen Reactions   • Morphine Shortness Of Breath   • Morphine And Related    • Adhesive Tape Rash     Bandaging tape       Family History   Problem Relation Age of Onset   • Heart attack Maternal Grandmother    • Hypertension Maternal Grandmother    • Heart attack Maternal Grandfather    • Hypertension Maternal Grandfather    • Hypertension Paternal Grandmother    • Obesity Paternal Grandmother    • Heart attack Other      Paternal history   • Hypertension Other      Paternal & Maternal history   • Diabetes Other      Maternal history   • Hypertension Mother    • Diabetes Mother    • Heart attack Mother    • Hypertension Father    • Heart attack Father    • Obesity Father    • Sleep apnea Father    • Diabetes Father        Social History     Social History   • Marital status:      Spouse name: Leon   • Number of children: N/A   • Years of education: N/A     Occupational History   • Not on file.     Social History Main Topics   • Smoking status: Current Some Day Smoker     Packs/day: 1.00     Years: 30.00   • Smokeless tobacco: Never Used   • Alcohol use Yes      Comment: socially   • Drug use: No   • Sexual activity: Defer     Other Topics Concern   • Not on file     Social History Narrative       Ready to quit: Not Answered  Counseling given: Not Answered       All the above social hx, family hx, surgical history,medications, allergies, ros & HPI reviewed.  Objective   Physical Exam   Constitutional: She is oriented to person, place, and time. She appears well-developed and well-nourished.   HENT:   Head: Normocephalic and atraumatic.   Eyes: EOM are normal. Pupils are equal, round, and reactive  to light.   Neck: Normal range of motion.   Cardiovascular: Normal rate.    Pulmonary/Chest: Effort normal.   Abdominal: Soft. Bowel sounds are normal.   Musculoskeletal: Normal range of motion.   Neurological: She is alert and oriented to person, place, and time. She has normal reflexes.   Skin: Skin is warm.   Psychiatric: She has a normal mood and affect. Her behavior is normal. Judgment and thought content normal.     Ortho Exam  Ortho Exam right  Lower extremity exam:  Vascular: Pulses are nonpalpable due to indurated edema to the right foot.  Capillary refill time is less than 5 seconds, some pedal hair is noted, edema and erythema are noted to bilateral lower extremities from the knee distally  Neuro: Sensation and protective sensation are intact.  Derm: On the anterior aspect of the mid tibia level there is a ulceration that measures 6 x 8 cm.  It somewhat white in appearance and has a weeping serosanguineous drainage.  The area around the wound is fairly tensely erythematous but this does desiree.  It's tender to palpation.  The area underneath this is somewhat soft and squishy in nature.  This is different compared to the left leg which is very hard and indurated.  There is mild warmth to the right leg.  MSK: No gross orthopedic deformities noted.    Assessment/Plan diabetes, bilateral lower extremity edema, right lower extremity venous stasis ulceration  Independent Review of Radiographic Studies:      Laboratory and Other Studies:     Medical Decision Making:        Procedures an Unna boot was placed on the right lower extremity.  [] No procedures were performed in office today.    Margarita was seen today for cellulitis.    Diagnoses and all orders for this visit:    Ulcer of foot, right, limited to breakdown of skin  -     XR Tibia Fibula 2 View Right  -     MRI Tibia Fibula Right Without Contrast; Future    Type 2 diabetes mellitus with other skin ulcer  -     MRI Tibia Fibula Right Without Contrast;  Future    Edema of extremity of unknown cause  -     MRI Tibia Fibula Right Without Contrast; Future        Recommendations/Plan:  I will have her continue the Unna boots today.  We placed an Unna boot on her today and continue with home health changing them.  I will send her for x-rays and would like to have an MRI if at all possible.  She has some type a metallic implant and this may not be possible so that CT scan may need to be ordered but I like to have the MRI done by next weeks' I can see her back.  I'm concerned that there may be some type of fluid collection or something underlying the skin that may be exacerbating this problem.  She is to call me with any questions or concerns.  Follow up in 1 week.  Vascular referral for venous disease is also need.    Return in about 1 week (around 9/15/2017).  Patient agreeable to call or return sooner for any concerns.

## 2017-09-15 NOTE — PROGRESS NOTES
Subjective   Patient ID: Margarita Dominguez is a 52 y.o. female returns for follow-up on her right leg swelling and wound with possible cellulitis.  She still complains of significant pain and states she hasn't been able to sleep.  She says she's been on Keflex in the past and just finished some minocycline as well.  She says home health has been coming to change her wraps.      History of Present Illness                                                   Review of Systems   All other systems reviewed and are negative.      Past Medical History:   Diagnosis Date   • Allergic    • Anxiety    • Arthritis    • Asthma    • Back pain 2015   • Bladder disease    • Chest pain syndrome 8/22/2016    with normal heart catheterization, December 2014: Normal LV function. Normal coronary arteries. Normal pulmonary artery pressure.   • Chronic pain    • COPD (chronic obstructive pulmonary disease)    • Depression    • Diabetes mellitus 2012   • Esophageal spasm    • Fibromyalgia    • Fibromyalgia    • GERD (gastroesophageal reflux disease)    • Heart murmur    • History of chest x-ray 03/17/2016    no acute appearing infiltrates she had some old rib fractures in the right    • History of chest x-ray 02/08/2016    No acute cardiopulmonary process   • History of chest x-ray 01/04/2016    No acute cardiopulmonary process.   • History of chest x-ray 12/18/2014    Negative chest for active disease. Heart size is normal. The heart is compensated.   • History of PFTs 03/17/2016    Spirometry suggests less than minimal effort since %FVC is < %TLC. moderate airways obstruction there was no significant improvement bronchodilators TLC was within normal limits she could not do diffusion study   • Hyperlipidemia    • Hypertension    • IBS (irritable bowel syndrome)    • IBS (irritable bowel syndrome)    • Joint pain    • Migraines    • Morbid obesity 8/22/2016   • Muscle spasm    • Obstructive sleep apnea    • Osteoarthritis    • Peripheral  edema    • Restless legs syndrome    • Rheumatoid arthritis    • Sleep apnea 2013   • Stress incontinence    • Syncope 8/22/2016    onset in summer 2015: “Negative” neurologic evaluation with negative CT and EEG.   • Tattoo    • Tobacco use disorder    • Vertigo 2015   • Vitamin D deficiency         Past Surgical History:   Procedure Laterality Date   • APPENDECTOMY  1976   • BACK SURGERY  2015   • CARDIAC CATHETERIZATION  2014   • CARDIAC SURGERY     • COLONOSCOPY  2017   • HYSTERECTOMY  2011   • INTERVENTIONAL RADIOLOGY PROCEDURE Bilateral 8/18/2017    Procedure: IR venogram lower extremity bilateral;  Surgeon: Hayden Romo MD;  Location: [facility] CATH INVASIVE LOCATION;  Service:    • INTERVENTIONAL RADIOLOGY PROCEDURE N/A 8/18/2017    Procedure: IR intravascular ultrasound each additional non coronary vessel;  Surgeon: Hayden Romo MD;  Location: [facility] CATH INVASIVE LOCATION;  Service:    • KNEE ARTHROSCOPY Right 2015   • MOUTH SURGERY     • TONSILLECTOMY  1970       Social History     Social History   • Marital status:      Spouse name: Leon   • Number of children: N/A   • Years of education: N/A     Occupational History   • Not on file.     Social History Main Topics   • Smoking status: Current Some Day Smoker     Packs/day: 1.00     Years: 30.00   • Smokeless tobacco: Never Used   • Alcohol use Yes      Comment: socially   • Drug use: No   • Sexual activity: Defer     Other Topics Concern   • Not on file     Social History Narrative       Ready to quit: Not Answered  Counseling given: Not Answered      All the above social hx, family hx, surgical history,medications, allergies, ros & HPI reviewed.    Allergies   Allergen Reactions   • Morphine Shortness Of Breath   • Morphine And Related    • Adhesive Tape Rash     Bandaging tape       Objective   Physical Exam   Constitutional: She is oriented to person, place, and time. She appears well-developed and well-nourished.   HENT:    Head: Normocephalic and atraumatic.   Eyes: EOM are normal. Pupils are equal, round, and reactive to light.   Neck: Normal range of motion.   Cardiovascular: Normal rate.    Pulmonary/Chest: Effort normal.   Abdominal: Soft. Bowel sounds are normal.   Musculoskeletal: Normal range of motion.   Neurological: She is alert and oriented to person, place, and time. She has normal reflexes.   Skin: Skin is warm.   Psychiatric: She has a normal mood and affect. Her behavior is normal. Judgment and thought content normal.     Ortho Exam  Ortho Exam  The anterior right leg wound is somewhat smaller but still present.  It still superficial and slightly draining.  There still some tense blanchable erythema and edema to the anterior distal leg.  She still grossly neurovascularly intact to the right lower extremity.  Pulses are palpable.  The entire distal leg from the wound is still very tender to palpation and painful.      Assessment/Plan  right anterior leg wound, cellulitis, venous insufficiency, diabetes  Independent Review of Radiographic Studies:      Laboratory and Other Studies:     Medical Decision Making:        Procedures Unna boot was placed around the right lower extremity  Debridement Note        Margarita was seen today for follow-up and cellulitis.    Diagnoses and all orders for this visit:    Type 2 diabetes mellitus with other skin ulcer    Venous insufficiency (chronic) (peripheral)    Ulcer of foot, right, limited to breakdown of skin    Cellulitis and abscess of leg    Other orders  -     clindamycin (CLEOCIN) 300 MG capsule; Take 1 capsule by mouth 3 (Three) Times a Day.  -     ciprofloxacin (CIPRO) 500 MG tablet; Take 1 tablet by mouth Daily.        Recommendations/Plan:  I recommend we continue with the Unna boots.  I swabbed the wound just for surveillance but explained these are typically colonized with Polymicrobial organisms.  I will place her on a clindamycin/Cipro antibiotic regimen and see how  this helps.  If this does not help we may need to consider I&D of the distal leg.  She should keep a close eye on this.  Continue to have the wraps changed to by weekly.  Follow up in roughly 10 days.    Return in about 12 days (around 9/27/2017).  Patient agreeable to call or return sooner for any concerns.

## 2017-10-04 PROBLEM — I73.9 PVD (PERIPHERAL VASCULAR DISEASE) (HCC): Status: ACTIVE | Noted: 2017-01-01

## 2017-10-06 NOTE — PROGRESS NOTES
Subjective   Patient ID: Margarita Dominguez is a 52 y.o. female with diabetes and morbid obesity comes in for right anterior leg wound/questionable cellulitis.  She had culture swabs taken her last visit which were positive for polymicrobial organisms/probable colonization.  I had placed her on clindamycin and ciprofloxacin combination and she states that she had been applying calamine soaked 4 x 4's over her leg in taping it.  She says it looks much better and it's getting much better.  She says it still burns quite a bit.  She complains of the antibiotics upsetting her stomach and making her nauseous.      History of Present Illness                                                   Review of Systems   Constitutional: Negative for diaphoresis, fever and unexpected weight change.   HENT: Negative for dental problem and sore throat.    Eyes: Negative for visual disturbance.   Respiratory: Negative for shortness of breath.    Cardiovascular: Negative for chest pain.   Gastrointestinal: Negative for abdominal pain, constipation, diarrhea, nausea and vomiting.   Genitourinary: Negative for difficulty urinating and frequency.   Skin: Positive for wound.   Neurological: Negative for headaches.   Hematological: Does not bruise/bleed easily.   All other systems reviewed and are negative.      Past Medical History:   Diagnosis Date   • Anxiety    • Arthritis    • Asthma    • Back pain 2015   • Chronic pain    • COPD (chronic obstructive pulmonary disease)    • Depression    • Diabetes mellitus 2012   • Esophageal spasm    • Fibromyalgia    • Fibromyalgia    • GERD (gastroesophageal reflux disease)    • Heart murmur    • Hyperlipidemia    • Hypertension    • IBS (irritable bowel syndrome)    • Joint pain    • Migraines    • Morbid obesity 8/22/2016   • Muscle spasm    • Obstructive sleep apnea    • Osteoarthritis    • Restless legs syndrome    • Sleep apnea 2013   • Stress incontinence    • Syncope 8/22/2016    onset in  summer 2015: “Negative” neurologic evaluation with negative CT and EEG.   • Tobacco use disorder    • Vertigo 2015   • Vitamin D deficiency         Past Surgical History:   Procedure Laterality Date   • APPENDECTOMY  1976   • BACK SURGERY  2015   • CARDIAC CATHETERIZATION  2014   • COLONOSCOPY  2017   • HYSTERECTOMY  2011   • INTERVENTIONAL RADIOLOGY PROCEDURE Bilateral 8/18/2017   • INTERVENTIONAL RADIOLOGY PROCEDURE N/A 8/18/2017   • KNEE ARTHROSCOPY Right 2015   • MOUTH SURGERY     • TONSILLECTOMY  1970       Social History     Social History   • Marital status:      Spouse name: Leon   • Number of children: N/A   • Years of education: N/A     Occupational History   • Not on file.     Social History Main Topics   • Smoking status: Current Some Day Smoker     Packs/day: 1.00     Years: 30.00     Types: Cigarettes   • Smokeless tobacco: Never Used   • Alcohol use Yes      Comment: socially   • Drug use: No   • Sexual activity: Defer     Other Topics Concern   • Not on file     Social History Narrative       Ready to quit: Not Answered  Counseling given: Not Answered      All the above social hx, family hx, surgical history,medications, allergies, ros & HPI reviewed.    Allergies   Allergen Reactions   • Morphine Shortness Of Breath   • Morphine And Related    • Adhesive Tape Rash     Bandaging tape       Objective   Physical Exam   Constitutional: She is oriented to person, place, and time. She appears well-developed and well-nourished.   HENT:   Head: Normocephalic and atraumatic.   Eyes: EOM are normal. Pupils are equal, round, and reactive to light.   Neck: Normal range of motion.   Cardiovascular: Normal rate.    Pulmonary/Chest: Effort normal.   Musculoskeletal: Normal range of motion.   Neurological: She is alert and oriented to person, place, and time. She has normal reflexes.   Skin: Skin is warm.   Psychiatric: She has a normal mood and affect. Her behavior is normal. Judgment and thought content  normal.     Ortho Exam  Ortho Exam  The anterior distal leg wound is much improved.  It's probably half the size it was the last time I saw her.  Its still barely full-thickness and still has some serosanguineous weeping but much less than what it was.  The erythema and edema present to the distal leg has resolved as well.  Much less tender to palpation.  She still grossly neurovascularly intact to right lower extremity.      Assessment/Plan  right lower extremity venous stasis ulceration  Independent Review of Radiographic Studies:      Laboratory and Other Studies:     Medical Decision Making:        Procedures an Unna boot was placed around the right lower extremity.  Debridement Note      Margarita was seen today for wound check and follow-up.    Diagnoses and all orders for this visit:    Ulcer of foot, right, limited to breakdown of skin        Recommendations/Plan:  After reviewing her cultures a Bactrim or sulfa-based antibiotic should cover all 3 organisms.  She tells me that she is not allergic to Bactrim but it does upset her stomach but she did be willing to take it if it would cover all 3 of the organisms.  If it makes her too sick and she cannot tolerate this she is to let me know and we will try another therapy.  We will continue with the Unna boot/calamine local wound care as it seems to be helping quite a bit.  She has a procedure scheduled today by Dr. Romo so hopefully that will help as well.  Follow-up next week.  Call me with any questions.    Return in about 1 week (around 10/13/2017).  Patient agreeable to call or return sooner for any concerns.

## 2017-10-06 NOTE — NURSING NOTE
1540  Patient allowed to dress.  Taken to family car via wheelchair accompanied by myself.  Patient in stable condition.

## 2017-10-06 NOTE — PLAN OF CARE
Problem: Patient Care Overview (Adult)  Goal: Plan of Care Review  Outcome: Outcome(s) achieved Date Met:  10/06/17  Goal: Adult Individualization and Mutuality  Outcome: Outcome(s) achieved Date Met:  10/06/17  Goal: Discharge Needs Assessment  Outcome: Outcome(s) achieved Date Met:  10/06/17    Problem: Cardiac Catheterization with/without PCI (Adult)  Goal: Signs and Symptoms of Listed Potential Problems Will be Absent or Manageable (Cardiac Catheterization with/without PCI)  Outcome: Ongoing (interventions implemented as appropriate)

## 2017-10-06 NOTE — DISCHARGE INSTR - APPOINTMENTS
You have an ECHO in Dr. Romo's office on October 25, Wednesday, at 10:30 a.m.  You also have a follow up appointment with Dr. Romo on November 2, Thursday, at 2:00 p.m.  If you are unable to keep these appointments, please call Dr. Romo's office and reschedule.  ([phone])

## 2017-10-13 NOTE — PROGRESS NOTES
Subjective   Patient ID: Margarita Dominguez is a 52 y.o. female   Returns for follow-up on her right anterior leg wound/cellulitis/venous insufficiency.  She says she came up with her own mixture of medication and thinks it's been helping.  She's been using calamine lotion and triple antibiotic ointment.  She says she isn't been wrapping it with Kiko and Coban.    History of Present Illness                                                   Review of Systems   Constitutional: Negative for diaphoresis, fever and unexpected weight change.   HENT: Negative for dental problem and sore throat.    Eyes: Negative for visual disturbance.   Respiratory: Negative for shortness of breath.    Cardiovascular: Negative for chest pain.   Gastrointestinal: Negative for abdominal pain, constipation, diarrhea, nausea and vomiting.   Genitourinary: Negative for difficulty urinating and frequency.   Skin: Positive for rash.   Neurological: Negative for headaches.   Hematological: Does not bruise/bleed easily.   All other systems reviewed and are negative.      Past Medical History:   Diagnosis Date   • Anxiety    • Arthritis    • Asthma    • Back pain 2015   • Chronic pain    • COPD (chronic obstructive pulmonary disease)    • Depression    • Diabetes mellitus 2012   • Esophageal spasm    • Fibromyalgia    • Fibromyalgia    • GERD (gastroesophageal reflux disease)    • Heart murmur    • Hyperlipidemia    • Hypertension    • IBS (irritable bowel syndrome)    • Joint pain    • Migraines    • Morbid obesity 8/22/2016   • Muscle spasm    • Obstructive sleep apnea    • Osteoarthritis    • Restless legs syndrome    • Sleep apnea 2013   • Stress incontinence    • Syncope 8/22/2016    onset in summer 2015: “Negative” neurologic evaluation with negative CT and EEG.   • Tobacco use disorder    • Vertigo 2015   • Vitamin D deficiency         Past Surgical History:   Procedure Laterality Date   • APPENDECTOMY  1976   • BACK SURGERY  2015   •  CARDIAC CATHETERIZATION  2014   • COLONOSCOPY  2017   • HYSTERECTOMY  2011   • INTERVENTIONAL RADIOLOGY PROCEDURE Bilateral 8/18/2017   • INTERVENTIONAL RADIOLOGY PROCEDURE N/A 8/18/2017   • INTERVENTIONAL RADIOLOGY PROCEDURE Right 10/6/2017    Procedure: IR venogram lower extremity right;  Surgeon: Hayden Romo MD;  Location: [facility] CATH INVASIVE LOCATION;  Service:    • INTERVENTIONAL RADIOLOGY PROCEDURE N/A 10/6/2017    Procedure: IR intravascular ultrasound each additional non coronary vessel;  Surgeon: Hayden Romo MD;  Location: [facility] CATH INVASIVE LOCATION;  Service:    • KNEE ARTHROSCOPY Right 2015   • MOUTH SURGERY     • TONSILLECTOMY  1970       Social History     Social History   • Marital status:      Spouse name: Leon   • Number of children: N/A   • Years of education: N/A     Occupational History   • Not on file.     Social History Main Topics   • Smoking status: Current Some Day Smoker     Packs/day: 1.00     Years: 30.00     Types: Cigarettes   • Smokeless tobacco: Never Used   • Alcohol use Yes      Comment: socially   • Drug use: No   • Sexual activity: Defer     Other Topics Concern   • Not on file     Social History Narrative       Ready to quit: Not Answered  Counseling given: Not Answered      All the above social hx, family hx, surgical history,medications, allergies, ros & HPI reviewed.    Allergies   Allergen Reactions   • Morphine Shortness Of Breath   • Morphine And Related    • Adhesive Tape Rash     Bandaging tape       Objective   Physical Exam   Constitutional: She is oriented to person, place, and time. She appears well-developed and well-nourished.   HENT:   Head: Normocephalic and atraumatic.   Eyes: EOM are normal. Pupils are equal, round, and reactive to light.   Neck: Normal range of motion.   Cardiovascular: Normal rate.    Pulmonary/Chest: Effort normal.   Musculoskeletal: Normal range of motion.   Neurological: She is alert and oriented to  person, place, and time. She has normal reflexes.   Skin: Skin is warm.   Psychiatric: She has a normal mood and affect. Her behavior is normal. Judgment and thought content normal.     Ortho Exam  Ortho Exam  Her edema and erythema of the right leg are much decreased.  The anterior leg is much less erythematous and edematous and angry appearing.  Overall diameter of the area measures roughly 6 x 6 but there is no significant weepage or drainage.  She is overall neurovascularly intact to the right lower extremity.      Assessment/Plan  lower extremity edema, diabetes, venous insufficiency, anterior leg ulceration  Independent Review of Radiographic Studies:      Laboratory and Other Studies:     Medical Decision Making:        Procedures  Debridement Note        Margarita was seen today for wound check and follow-up.    Diagnoses and all orders for this visit:    Ulcer of foot, right, limited to breakdown of skin    Venous insufficiency (chronic) (peripheral)    Type 2 diabetes mellitus with other skin ulcer, with long-term current use of insulin    Edema of extremity of unknown cause          Recommendations/Plan:  I will have her continue with her current mixture of medication.  I just reminded her to ensure that they are placing a layer of something padded under the coban and not wrapping Coban directly on her skin as this could cause complication.  His arm she's doing well and still progressing all see her back in 2 weeks.    Return in about 2 weeks (around 10/27/2017).  Patient agreeable to call or return sooner for any concerns.

## 2017-10-18 NOTE — PROGRESS NOTES
Subjective   Margarita Dominguez is a 52 y.o. female    Chief Complaint   Patient presents with   • Follow-up     Pt is here for neck pain. Pt states that the neck pain started approx 2 weeks ago. Pt states there was an injury that occured to her neck approx 16 years ago but believes it is not related ot her pain.        History of Present Illness     Pt is a 52 yr old female in clinic for cervical neck pain onset couple months ago. Denies recent trauma.  + cervical pain radiates right UE to shoulder.  + HAs 8-10 monthly lasting 4 hours or more. States starts right shoulder and radiates into skull. C/O migraine x 8 months with no relief through meds. Pt using fioricet and hydrocodone through pain clinic for back pain.  Pt with hx SABA states using CPAP nightly without difficulty. No chip in clinic.     The following portions of the patient's history were reviewed and updated as appropriate: allergies, current medications, past family history, past medical history, past social history, past surgical history and problem list.    Review of Systems   Constitutional: Positive for activity change. Negative for chills and fever.   HENT: Negative for congestion, ear pain, hearing loss, rhinorrhea and sore throat.    Eyes: Negative for pain, discharge and redness.   Respiratory: Positive for shortness of breath. Negative for cough, wheezing and stridor.    Cardiovascular: Positive for palpitations. Negative for chest pain and leg swelling.   Gastrointestinal: Negative for abdominal pain, constipation, nausea and vomiting.   Endocrine: Negative for cold intolerance, heat intolerance and polyphagia.   Genitourinary: Negative for dysuria, flank pain, frequency and urgency.   Musculoskeletal: Positive for arthralgias, back pain, gait problem, joint swelling, myalgias, neck pain and neck stiffness.   Skin: Negative for pallor, rash and wound.   Allergic/Immunologic: Negative for environmental allergies.   Neurological: Positive  "for numbness and headaches. Negative for dizziness, tremors, seizures, syncope, facial asymmetry, speech difficulty, weakness and light-headedness.   Hematological: Negative for adenopathy.   Psychiatric/Behavioral: Positive for sleep disturbance. Negative for confusion and hallucinations. The patient is nervous/anxious.        Objective         Vitals:    10/18/17 1056   BP: 124/70   Pulse: 90   SpO2: 96%   Weight: (!) 342 lb (155 kg)   Height: 66\" (167.6 cm)     GENERAL: Patient is pleasant, cooperative, appears to be stated age.  Body habitus is endomorphic.  HEAD:  Head is normocephalic and atraumatic.    NECK: Neck + cervical tenderness with radiculopathy palp and ROM  Carotic upstrokes are 1+/4.  No cranial or cervical bruits.  The neck is supple with a full range of motion.   ENT: palate elevate symmetrically, no evidence of high arch palate, tongue midline erythema in posterior pharynx, Mallampati Classification Class III   CARDIOVASCULAR:  Regular rate and rhythm with normal S1 and S2 without rub or gallop. + LE 4+ edema + cellulitis Right LE (treating through wound clinic)  RESPIRATORY:  + exp wheezes no crackle   PSYCH:   The patient is alert.  She  is oriented x3 to time, place and person.  Recent and the remote memory appear normal.  The patient has a good fund of knowledge.  There is no visual or auditory hallucination or suicidal or homicidal ideation.  CRANIAL NERVES:   Extraocular movements are full and smooth with normal pursuits and saccades.  No nystagmus noted.  The face is symmetric. palate elevate symmetrically, Tongue midline    MOTOR: Strength is 5/5 throughout with normal tone and bulk  No involuntary movements noted.    COORDINATION AND GAIT:  The patient walks with a wide based limping gait.      Results for orders placed or performed during the hospital encounter of 10/06/17   Comprehensive Metabolic Panel   Result Value Ref Range    Glucose 115 (H) 74 - 98 mg/dL    BUN 24 (H) 7 - 20 " mg/dL    Creatinine 1.50 (H) 0.60 - 1.30 mg/dL    Sodium 141 137 - 145 mmol/L    Potassium 4.8 3.5 - 5.1 mmol/L    Chloride 101 98 - 107 mmol/L    CO2 29.0 26.0 - 30.0 mmol/L    Calcium 9.3 8.4 - 10.2 mg/dL    Total Protein 6.9 6.3 - 8.2 g/dL    Albumin 4.00 3.50 - 5.00 g/dL    ALT (SGPT) 40 13 - 69 U/L    AST (SGOT) 21 15 - 46 U/L    Alkaline Phosphatase 78 38 - 126 U/L    Total Bilirubin 0.3 0.2 - 1.3 mg/dL    eGFR Non African Amer 37 (L) >60 mL/min/1.73    Globulin 2.9 gm/dL    A/G Ratio 1.4 1.0 - 2.0 g/dL    BUN/Creatinine Ratio 16.0 7.1 - 23.5    Anion Gap 15.8 mmol/L   Protime-INR   Result Value Ref Range    Protime 12.8 (H) 9.3 - 12.1 Seconds    INR 1.17 (H) 0.90 - 1.10   aPTT   Result Value Ref Range    PTT 31.2 25.0 - 36.0 seconds   CBC Auto Differential   Result Value Ref Range    WBC 10.95 (H) 4.80 - 10.80 10*3/mm3    RBC 4.50 4.20 - 5.40 10*6/mm3    Hemoglobin 12.3 12.0 - 16.0 g/dL    Hematocrit 39.8 37.0 - 47.0 %    MCV 88.4 81.0 - 99.0 fL    MCH 27.3 27.0 - 31.0 pg    MCHC 30.9 30.0 - 37.0 g/dL    RDW 16.8 (H) 11.5 - 14.5 %    RDW-SD 53.9 37.0 - 54.0 fl    MPV 11.3 6.0 - 12.0 fL    Platelets 205 130 - 400 10*3/mm3    Neutrophil % 68.8 37.0 - 80.0 %    Lymphocyte % 19.9 10.0 - 50.0 %    Monocyte % 7.8 0.0 - 12.0 %    Eosinophil % 2.4 0.0 - 7.0 %    Basophil % 0.6 0.0 - 2.5 %    Immature Grans % 0.5 0.0 - 0.6 %    Neutrophils, Absolute 7.54 (H) 2.00 - 6.90 10*3/mm3    Lymphocytes, Absolute 2.18 0.60 - 3.40 10*3/mm3    Monocytes, Absolute 0.85 0.00 - 0.90 10*3/mm3    Eosinophils, Absolute 0.26 0.00 - 0.70 10*3/mm3    Basophils, Absolute 0.07 0.00 - 0.20 10*3/mm3    Immature Grans, Absolute 0.05 0.00 - 0.06 10*3/mm3    nRBC 0.0 0.0 - 0.0 /100 WBC       Us Guided Vascular Access    Result Date: 10/6/2017  Narrative: This procedure was auto-finalized with no dictation required.      I have personally reviewed the above labs and imaging studies.    Assessment/Plan     1. SABA: CPAP reports using nightly. No  chip in clinic to review sleep compliance. RTC 3 months pt to return with chip.   I have counseled patient extensively on Sleep Hygiene including regular sleep wake schedule and stimulus control therapy.  I have also discussed the importance of weight reduction because 10% reduction in body weight can reduce sleep apnea by 50 %. We have also discussed abstaining from smoking and drinking.  I have explained to patient that obstructive apnea episode is defined as the absence of airflow for at least 10 seconds.  Sleep apnea is usually accompanied by snoring, disturbed sleep, and daytime sleepiness. Patients with micrognathia, retrognathia, enlarged tonsils, tongue enlargement, and acromegaly are especially predisposed to obstructive sleep apnea. Abnormalities or weakness in the muscles can also contribute to obstructive sleep apnea. Obesity can also contribute to sleep apnea.      Sleep apnea can lead to a number of complications, ranging from daytime sleepiness to possible increased risk of cardiovascular risks.   Daytime sleepiness is the most serious.  Daytime sleepiness can also increase the risk for accident-related injuries. Several studies have suggested that people with sleep apnea have two to three times as many car accidents, and five to seven times the risk for multiple accidents.   A number of cardiovascular diseases -- including high blood pressure, heart failure, stroke, and heart arrhythmias -- have an association with obstructive sleep apnea.   Up to a third of patients with heart failure also have sleep apnea. Both central and obstructive sleep apnea are linked with heart failure. Obstructive sleep apnea is also noted to be associated with type 2 diabetes according to Dr. Viera at [facility].  The best treatment for symptomatic obstructive sleep apnea is continuous positive airflow pressure (CPAP). Bilevel positive airway pressure (BPAP) systems may be particularly helpful for patients with  coexisting lung disease and those with excessive levels of carbon dioxide.  Other treatment options including UPPP surgery, LAUP surgery, radiofrequency somnoplasty and dental appliances such [place] or [place].        2. RLS:  Stable with Horizant 600mg daily     3. Cervicalgia with migraines: sched PT, add zanaflex back into regimen. Discussed with pt narcotics have been found to create rebound HAs. Pt verbalized understanding but states has to have meds for back

## 2017-10-27 ENCOUNTER — OFFICE VISIT (OUTPATIENT)
Dept: SURGERY | Age: 52
End: 2017-10-27
Payer: MEDICAID

## 2017-10-27 ENCOUNTER — HOSPITAL ENCOUNTER (OUTPATIENT)
Dept: OTHER | Age: 52
Discharge: HOME | End: 2017-10-27
Attending: SURGERY | Admitting: SURGERY

## 2017-10-27 VITALS
BODY MASS INDEX: 47.09 KG/M2 | SYSTOLIC BLOOD PRESSURE: 129 MMHG | HEART RATE: 77 BPM | WEIGHT: 293 LBS | TEMPERATURE: 98.4 F | RESPIRATION RATE: 16 BRPM | HEIGHT: 66 IN | DIASTOLIC BLOOD PRESSURE: 77 MMHG

## 2017-10-27 DIAGNOSIS — K80.20 CALCULUS OF GALLBLADDER WITHOUT CHOLECYSTITIS WITHOUT OBSTRUCTION: Primary | ICD-10-CM

## 2017-10-27 PROCEDURE — 99244 OFF/OP CNSLTJ NEW/EST MOD 40: CPT | Performed by: SURGERY

## 2017-10-27 RX ORDER — FLUTICASONE FUROATE AND VILANTEROL 200; 25 UG/1; UG/1
POWDER RESPIRATORY (INHALATION) PRN
COMMUNITY

## 2017-10-27 RX ORDER — SPIRONOLACTONE 50 MG/1
50 TABLET, FILM COATED ORAL 2 TIMES DAILY
COMMUNITY

## 2017-10-27 RX ORDER — CHLORTHALIDONE 25 MG/1
25 TABLET ORAL 2 TIMES DAILY
COMMUNITY

## 2017-10-27 RX ORDER — CLOPIDOGREL BISULFATE 75 MG/1
75 TABLET ORAL DAILY
COMMUNITY

## 2017-10-27 NOTE — PROGRESS NOTES
[facility]  Jorge Li, MD [address] [place], [place]. [address]      Reason for Consult:  gallstones  Requesting Physician: Kike Hawley 66 requested from [address] [place] [address] Drive to evaluate and treat gallstones      History Obtained From:  patient, outside u/s report    HISTORY OF PRESENT ILLNESS:                The patient is a 47 y.o. female who presents with RUQcolicky, cramping and pressure-like. nausea and vomiting for several months. Had EGD and colonoscopy at [address] [place] [address] 06/21/17 with findings of GERD and colon polyps. U/s gb 06/21/17 at [address] [place] [address] showed gallstones without acute inflammation or biliary ductal dilation. It is unclear why she has not been referred for surgical consultation until now. She denies jaundice choluria or acholic stools. On plavix for one month since stent placed in left iliac artery by Dr. Galo Adams. Past Medical History:        Diagnosis Date    Diabetes mellitus ([address]     Hypertension      Past Surgical History:        Procedure Laterality Date    ANTERIOR CRUCIATE LIGAMENT REPAIR      APPENDECTOMY  1976    BACK SURGERY      COLONOSCOPY  2014    CORONARY ANGIOPLASTY      HYSTERECTOMY  2012    TONSILLECTOMY  1970     Current Medications:   Prior to Admission medications    Medication Sig Start Date End Date Taking?  Authorizing Provider   clopidogrel (PLAVIX) 75 MG tablet Take 75 mg by mouth daily   Yes Historical Provider, MD   spironolactone (ALDACTONE) 50 MG tablet Take 50 mg by mouth 2 times daily   Yes Historical Provider, MD   metoprolol tartrate (LOPRESSOR) 25 MG tablet Take 25 mg by mouth 2 times daily   Yes Historical Provider, MD   chlorthalidone (HYGROTON) 25 MG tablet Take 25 mg by mouth 2 times daily   Yes Historical Provider, MD   Fluticasone Furoate-Vilanterol (BREO ELLIPTA) 200-25 MCG/INH AEPB Inhale into the lungs as needed   Yes Historical Provider, MD   dexlansoprazole (DEXILANT) 60 MG CPDR delayed release capsule Take 60 mg by mouth daily   Yes Historical Provider, MD   fluconazole (DIFLUCAN) 200 MG tablet Take 200 mg by mouth daily   Yes Historical Provider, MD   HYDROcodone-acetaminophen (NORCO) 5-325 MG per tablet Take 7.5 tablets by mouth every 8 hours as needed for Pain  . Yes Historical Provider, MD   nitroGLYCERIN (NITROSTAT) 0.4 MG SL tablet Place 0.4 mg under the tongue every 5 minutes as needed for Chest pain up to max of 3 total doses. If no relief after 1 dose, call 911. Yes Historical Provider, MD   Biotin 5 MG CAPS Take 5 mg by mouth 2 times daily   Yes Historical Provider, MD   febuxostat (ULORIC) 40 MG TABS tablet Take 60 mg by mouth daily    Yes Historical Provider, MD   Potassium (POTASSIMIN PO) Take 10 mEq by mouth daily    Yes Historical Provider, MD   tiotropium (SPIRIVA RESPIMAT) 2.5 MCG/ACT AERS inhaler Inhale 2 puffs into the lungs daily   Yes Historical Provider, MD   IPRATROPIUM BROMIDE NA 21 mcg by Nasal route 2 times daily   Yes Historical Provider, MD   Pseudoephedrine-Guaifenesin (MUCINEX D PO) Take by mouth as needed   Yes Historical Provider, MD   levocetirizine (XYZAL) 5 MG tablet Take 5 mg by mouth every morning   Yes Historical Provider, MD   albuterol sulfate  (90 BASE) MCG/ACT inhaler Inhale 2 puffs into the lungs as needed for Wheezing (Emergency inhaler.)   Yes Historical Provider, MD   EPINEPHrine (EPIPEN) 0.3 MG/0.3ML SOAJ injection Inject 0.3 mg into the muscle as needed Use as directed for allergic reaction   Yes Historical Provider, MD   ranitidine (ZANTAC) 300 MG tablet Take 300 mg by mouth 2 times daily    Yes Historical Provider, MD   montelukast (SINGULAIR) 10 MG tablet Take 10 mg by mouth nightly. Yes Historical Provider, MD   estradiol (ESTRACE) 2 MG tablet Take 2 mg by mouth daily.    Yes Historical Provider, MD   furosemide (LASIX) 20 MG tablet Take 20 mg by mouth daily    Yes Historical Provider, MD   pramipexole (MIRAPEX) 1 MG tablet Take 1.5 mg by mouth 3 times daily Yes Historical Provider, MD   butalbital-acetaminophen-caffeine (FIORICET, ESGIC) -40 MG per tablet Take 1 tablet by mouth every 4 hours as needed for Pain. Yes Historical Provider, MD   ALLERGEN EXTRACT every 7 days. Yes Historical Provider, MD   ondansetron (ZOFRAN-ODT) 4 MG disintegrating tablet Take 4 mg by mouth every 8 hours as needed for Nausea or Vomiting. Yes Historical Provider, MD   aspirin 325 MG tablet Take 325 mg by mouth daily. Yes Historical Provider, MD   clonazePAM (KLONOPIN) 0.5 MG tablet Take 0.5 mg by mouth as needed for Anxiety. Yes Historical Provider, MD   lisinopril (PRINIVIL;ZESTRIL) 20 MG tablet Take 2.5 mg by mouth daily Med. is on hold at the present    Historical Provider, MD     Allergies:  Morphine and Bactrim [sulfamethoxazole-trimethoprim]    Social History:   TOBACCO:   reports that she has been smoking. She has a 30.00 pack-year smoking history. She has never used smokeless tobacco.  ETOH:   reports that she does not drink alcohol. DRUGS:   reports that she does not use drugs. MARITAL STATUS:    OCCUPATION:    Family History:       Problem Relation Age of Onset    High Blood Pressure Mother     Diabetes Mother     High Blood Pressure Father     High Blood Pressure Sister     High Blood Pressure Brother        REVIEW OF SYSTEMS:    CONSTITUTIONAL:  negative  EYES:  negative  HEENT:  negative  RESPIRATORY:  negative  CARDIOVASCULAR:  negative  GASTROINTESTINAL:  negative except for above  GENITOURINARY:  negative  INTEGUMENT/BREAST:  negative  Cellulitis LE's persistent for over one year.   HEMATOLOGIC/LYMPHATIC:  negative  ALLERGIC/IMMUNOLOGIC:  negative  ENDOCRINE:  negative  MUSCULOSKELETAL:  negative  NEUROLOGICAL:  negative  BEHAVIOR/PSYCH:  negative    PHYSICAL EXAM:    VITALS:  /77 (Site: Left Arm, Position: Sitting)   Pulse 77   Temp 98.4 °F (36.9 °C)   Resp 16   Ht 5' 6\" (1.676 m)   Wt (!) 340 lb 1.6 oz (154.3 kg)   BMI 54.89 kg/m² requested    IMPRESSION/RECOMMENDATIONS:  Cholelithiasis  PVD  Cellulitis RLE with ulcerations    Plan:Advised against surgical intervention at this time. Long d/w patient regarding increased risks of anesthesia and operation. She appears to understand. Consult faxed to [facility]; Thank you for the opportunity to participate in this patient's care.   Electronically signed by Enrike Henry MD on 10/27/2017 at 11:41 AM

## 2017-11-29 NOTE — TELEPHONE ENCOUNTER
Pt called the office earlier and spoke with Eliseo Bueno she talked about not hearing from physical therapy for her neck and stated that it was getting really hard for her to hold her head and up and was painful, she asked that we order a C Collar to help. Per RAO Wyatt she would rather have the patient have a CT scan of her neck. Pt has a history of kidney problems so an order was placed for labs and pt was advised to have the labs completed before the CT scan. Pt was advised that at this time RAO Wyatt did not want to order a C collar at this time and was advised to go to the ER if her pain was that severe.     Pt verbalized understanding.

## 2018-01-01 ENCOUNTER — HOSPITAL ENCOUNTER (OUTPATIENT)
Dept: MRI IMAGING | Facility: HOSPITAL | Age: 53
Discharge: HOME | End: 2018-02-26

## 2018-01-01 ENCOUNTER — TRANSCRIBE ORDERS (OUTPATIENT)
Dept: MRI IMAGING | Facility: HOSPITAL | Age: 53
End: 2018-01-01

## 2018-01-01 ENCOUNTER — APPOINTMENT (OUTPATIENT)
Dept: CT IMAGING | Facility: HOSPITAL | Age: 53
End: 2018-01-01

## 2018-01-01 ENCOUNTER — APPOINTMENT (OUTPATIENT)
Dept: MRI IMAGING | Facility: HOSPITAL | Age: 53
End: 2018-01-01

## 2018-01-01 ENCOUNTER — TRANSCRIBE ORDERS (OUTPATIENT)
Dept: ADMINISTRATIVE | Facility: HOSPITAL | Age: 53
End: 2018-01-01

## 2018-01-01 ENCOUNTER — OFFICE VISIT (OUTPATIENT)
Dept: ORTHOPEDIC SURGERY | Facility: CLINIC | Age: 53
End: 2018-01-01

## 2018-01-01 ENCOUNTER — TELEPHONE (OUTPATIENT)
Dept: NEUROLOGY | Facility: CLINIC | Age: 53
End: 2018-01-01

## 2018-01-01 ENCOUNTER — LAB (OUTPATIENT)
Dept: LAB | Facility: HOSPITAL | Age: 53
End: 2018-01-01

## 2018-01-01 ENCOUNTER — HOSPITAL ENCOUNTER (EMERGENCY)
Facility: HOSPITAL | Age: 53
Discharge: HOME | End: 2018-04-26
Attending: EMERGENCY MEDICINE | Admitting: EMERGENCY MEDICINE

## 2018-01-01 ENCOUNTER — TRANSCRIBE ORDERS (OUTPATIENT)
Dept: MAMMOGRAPHY | Facility: HOSPITAL | Age: 53
End: 2018-01-01

## 2018-01-01 ENCOUNTER — LAB (OUTPATIENT)
Dept: LAB | Facility: HOSPITAL | Age: 53
End: 2018-01-01
Attending: PSYCHIATRY & NEUROLOGY

## 2018-01-01 ENCOUNTER — LAB REQUISITION (OUTPATIENT)
Dept: LAB | Facility: HOSPITAL | Age: 53
End: 2018-01-01

## 2018-01-01 ENCOUNTER — TRANSCRIBE ORDERS (OUTPATIENT)
Dept: GENERAL RADIOLOGY | Facility: HOSPITAL | Age: 53
End: 2018-01-01

## 2018-01-01 ENCOUNTER — HOSPITAL ENCOUNTER (OUTPATIENT)
Dept: MRI IMAGING | Facility: HOSPITAL | Age: 53
Discharge: HOME | End: 2018-05-08
Admitting: FAMILY MEDICINE

## 2018-01-01 ENCOUNTER — APPOINTMENT (OUTPATIENT)
Dept: GENERAL RADIOLOGY | Facility: HOSPITAL | Age: 53
End: 2018-01-01

## 2018-01-01 ENCOUNTER — HOSPITAL ENCOUNTER (OUTPATIENT)
Dept: GENERAL RADIOLOGY | Facility: HOSPITAL | Age: 53
Discharge: HOME | End: 2018-01-08
Admitting: NURSE PRACTITIONER

## 2018-01-01 ENCOUNTER — OFFICE VISIT (OUTPATIENT)
Dept: OBSTETRICS AND GYNECOLOGY | Facility: CLINIC | Age: 53
End: 2018-01-01

## 2018-01-01 ENCOUNTER — OFFICE VISIT (OUTPATIENT)
Dept: NEUROSURGERY | Facility: CLINIC | Age: 53
End: 2018-01-01

## 2018-01-01 ENCOUNTER — HOSPITAL ENCOUNTER (OUTPATIENT)
Dept: MRI IMAGING | Facility: HOSPITAL | Age: 53
Discharge: HOME | End: 2018-05-24
Admitting: NURSE PRACTITIONER

## 2018-01-01 ENCOUNTER — HOSPITAL ENCOUNTER (EMERGENCY)
Facility: HOSPITAL | Age: 53
Discharge: HOME | End: 2018-05-21
Attending: EMERGENCY MEDICINE | Admitting: EMERGENCY MEDICINE

## 2018-01-01 ENCOUNTER — TELEPHONE (OUTPATIENT)
Dept: ORTHOPEDIC SURGERY | Facility: CLINIC | Age: 53
End: 2018-01-01

## 2018-01-01 ENCOUNTER — TRANSCRIBE ORDERS (OUTPATIENT)
Dept: CT IMAGING | Facility: HOSPITAL | Age: 53
End: 2018-01-01

## 2018-01-01 ENCOUNTER — HOSPITAL ENCOUNTER (OUTPATIENT)
Dept: MRI IMAGING | Facility: HOSPITAL | Age: 53
Discharge: HOME | End: 2018-02-26
Admitting: FAMILY MEDICINE

## 2018-01-01 ENCOUNTER — OFFICE VISIT (OUTPATIENT)
Dept: NEUROLOGY | Facility: CLINIC | Age: 53
End: 2018-01-01

## 2018-01-01 ENCOUNTER — RESULTS ENCOUNTER (OUTPATIENT)
Dept: NEUROLOGY | Facility: CLINIC | Age: 53
End: 2018-01-01

## 2018-01-01 VITALS
SYSTOLIC BLOOD PRESSURE: 110 MMHG | WEIGHT: 293 LBS | HEIGHT: 66 IN | DIASTOLIC BLOOD PRESSURE: 70 MMHG | BODY MASS INDEX: 47.09 KG/M2

## 2018-01-01 VITALS — HEIGHT: 65 IN | BODY MASS INDEX: 48.82 KG/M2 | RESPIRATION RATE: 18 BRPM | WEIGHT: 293 LBS

## 2018-01-01 VITALS
WEIGHT: 293 LBS | OXYGEN SATURATION: 95 % | BODY MASS INDEX: 47.09 KG/M2 | HEIGHT: 66 IN | SYSTOLIC BLOOD PRESSURE: 138 MMHG | DIASTOLIC BLOOD PRESSURE: 68 MMHG | HEART RATE: 80 BPM

## 2018-01-01 VITALS
RESPIRATION RATE: 18 BRPM | SYSTOLIC BLOOD PRESSURE: 146 MMHG | OXYGEN SATURATION: 93 % | HEART RATE: 98 BPM | TEMPERATURE: 98.5 F | HEIGHT: 66 IN | WEIGHT: 293 LBS | DIASTOLIC BLOOD PRESSURE: 72 MMHG | BODY MASS INDEX: 47.09 KG/M2

## 2018-01-01 VITALS
OXYGEN SATURATION: 94 % | HEART RATE: 96 BPM | DIASTOLIC BLOOD PRESSURE: 82 MMHG | HEIGHT: 66 IN | BODY MASS INDEX: 47.09 KG/M2 | RESPIRATION RATE: 20 BRPM | SYSTOLIC BLOOD PRESSURE: 136 MMHG | WEIGHT: 293 LBS | TEMPERATURE: 98 F

## 2018-01-01 VITALS — WEIGHT: 293 LBS | HEIGHT: 66 IN | BODY MASS INDEX: 47.09 KG/M2 | RESPIRATION RATE: 16 BRPM

## 2018-01-01 VITALS — RESPIRATION RATE: 16 BRPM | WEIGHT: 293 LBS | BODY MASS INDEX: 48.82 KG/M2 | HEIGHT: 65 IN

## 2018-01-01 VITALS — WEIGHT: 293 LBS | HEIGHT: 65 IN | RESPIRATION RATE: 16 BRPM | BODY MASS INDEX: 48.82 KG/M2

## 2018-01-01 VITALS — BODY MASS INDEX: 48.82 KG/M2 | WEIGHT: 293 LBS | HEIGHT: 65 IN

## 2018-01-01 VITALS — HEIGHT: 65 IN | RESPIRATION RATE: 16 BRPM | BODY MASS INDEX: 48.82 KG/M2 | WEIGHT: 293 LBS

## 2018-01-01 VITALS — WEIGHT: 293 LBS | HEIGHT: 66 IN | HEART RATE: 100 BPM | OXYGEN SATURATION: 97 % | BODY MASS INDEX: 47.09 KG/M2

## 2018-01-01 VITALS — RESPIRATION RATE: 16 BRPM | HEIGHT: 66 IN | HEART RATE: 58 BPM

## 2018-01-01 DIAGNOSIS — R60.0 LOCALIZED EDEMA: ICD-10-CM

## 2018-01-01 DIAGNOSIS — R07.9 CHEST PAIN, UNSPECIFIED TYPE: Primary | ICD-10-CM

## 2018-01-01 DIAGNOSIS — R53.1 WEAKNESS: ICD-10-CM

## 2018-01-01 DIAGNOSIS — R60.0 EDEMA OF EXTREMITY OF UNKNOWN CAUSE: ICD-10-CM

## 2018-01-01 DIAGNOSIS — R60.0 LOCALIZED EDEMA: Primary | ICD-10-CM

## 2018-01-01 DIAGNOSIS — E11.622 TYPE 2 DIABETES MELLITUS WITH OTHER SKIN ULCER, WITH LONG-TERM CURRENT USE OF INSULIN (HCC): ICD-10-CM

## 2018-01-01 DIAGNOSIS — M54.6 THORACIC SPINE PAIN: Primary | ICD-10-CM

## 2018-01-01 DIAGNOSIS — H02.401 PTOSIS OF RIGHT EYELID: ICD-10-CM

## 2018-01-01 DIAGNOSIS — Z79.4 TYPE 2 DIABETES MELLITUS WITH OTHER SKIN ULCER, WITH LONG-TERM CURRENT USE OF INSULIN (HCC): Primary | ICD-10-CM

## 2018-01-01 DIAGNOSIS — R51.9 NONINTRACTABLE HEADACHE, UNSPECIFIED CHRONICITY PATTERN, UNSPECIFIED HEADACHE TYPE: Primary | ICD-10-CM

## 2018-01-01 DIAGNOSIS — Z79.4 TYPE 2 DIABETES MELLITUS WITH OTHER SKIN ULCER, WITH LONG-TERM CURRENT USE OF INSULIN (HCC): ICD-10-CM

## 2018-01-01 DIAGNOSIS — R53.1 WEAKNESS: Primary | ICD-10-CM

## 2018-01-01 DIAGNOSIS — Z01.419 NORMAL GYNECOLOGIC EXAMINATION: Primary | ICD-10-CM

## 2018-01-01 DIAGNOSIS — M48.02 CERVICAL STENOSIS OF SPINAL CANAL: ICD-10-CM

## 2018-01-01 DIAGNOSIS — M54.6 THORACIC SPINE PAIN: ICD-10-CM

## 2018-01-01 DIAGNOSIS — R60.9 EDEMA, UNSPECIFIED TYPE: ICD-10-CM

## 2018-01-01 DIAGNOSIS — L97.511 ULCER OF FOOT, RIGHT, LIMITED TO BREAKDOWN OF SKIN (HCC): ICD-10-CM

## 2018-01-01 DIAGNOSIS — G44.229 CHRONIC TENSION-TYPE HEADACHE, NOT INTRACTABLE: Primary | ICD-10-CM

## 2018-01-01 DIAGNOSIS — M47.816 FACET ARTHRITIS OF LUMBAR REGION: ICD-10-CM

## 2018-01-01 DIAGNOSIS — I87.2 VENOUS INSUFFICIENCY (CHRONIC) (PERIPHERAL): ICD-10-CM

## 2018-01-01 DIAGNOSIS — S91.001S WOUND OF RIGHT ANKLE, SEQUELA: ICD-10-CM

## 2018-01-01 DIAGNOSIS — R11.2 NAUSEA AND VOMITING, INTRACTABILITY OF VOMITING NOT SPECIFIED, UNSPECIFIED VOMITING TYPE: ICD-10-CM

## 2018-01-01 DIAGNOSIS — R06.02 SHORTNESS OF BREATH: Primary | ICD-10-CM

## 2018-01-01 DIAGNOSIS — L97.921 SKIN ULCER OF LEFT LOWER LEG, LIMITED TO BREAKDOWN OF SKIN (HCC): ICD-10-CM

## 2018-01-01 DIAGNOSIS — S46.912A STRAIN OF LEFT SHOULDER, INITIAL ENCOUNTER: Primary | ICD-10-CM

## 2018-01-01 DIAGNOSIS — E87.6 HYPOKALEMIA: ICD-10-CM

## 2018-01-01 DIAGNOSIS — E11.622 TYPE 2 DIABETES MELLITUS WITH OTHER SKIN ULCER, WITH LONG-TERM CURRENT USE OF INSULIN (HCC): Primary | ICD-10-CM

## 2018-01-01 DIAGNOSIS — L02.419 CELLULITIS AND ABSCESS OF LEG: ICD-10-CM

## 2018-01-01 DIAGNOSIS — M25.562 PAIN IN BOTH KNEES, UNSPECIFIED CHRONICITY: Primary | ICD-10-CM

## 2018-01-01 DIAGNOSIS — G44.229 CHRONIC TENSION-TYPE HEADACHE, NOT INTRACTABLE: ICD-10-CM

## 2018-01-01 DIAGNOSIS — M54.2 CERVICALGIA: Primary | ICD-10-CM

## 2018-01-01 DIAGNOSIS — H49.01 WEAKNESS OF RIGHT THIRD CRANIAL NERVE: Primary | ICD-10-CM

## 2018-01-01 DIAGNOSIS — H49.01 WEAKNESS OF RIGHT THIRD CRANIAL NERVE: ICD-10-CM

## 2018-01-01 DIAGNOSIS — I87.2 VENOUS INSUFFICIENCY (CHRONIC) (PERIPHERAL): Primary | ICD-10-CM

## 2018-01-01 DIAGNOSIS — M51.36 DDD (DEGENERATIVE DISC DISEASE), LUMBAR: ICD-10-CM

## 2018-01-01 DIAGNOSIS — L03.119 CELLULITIS AND ABSCESS OF LEG: ICD-10-CM

## 2018-01-01 DIAGNOSIS — R06.02 SHORTNESS OF BREATH: ICD-10-CM

## 2018-01-01 DIAGNOSIS — M54.50 LOW BACK PAIN POTENTIALLY ASSOCIATED WITH RADICULOPATHY: Primary | ICD-10-CM

## 2018-01-01 DIAGNOSIS — M30.0 POLYARTERITIS NODOSA (HCC): ICD-10-CM

## 2018-01-01 DIAGNOSIS — M54.12 CERVICAL RADICULOPATHY: ICD-10-CM

## 2018-01-01 DIAGNOSIS — M94.261 CHONDROMALACIA OF RIGHT KNEE: Primary | ICD-10-CM

## 2018-01-01 DIAGNOSIS — M54.50 LOW BACK PAIN POTENTIALLY ASSOCIATED WITH RADICULOPATHY: ICD-10-CM

## 2018-01-01 DIAGNOSIS — H49.01 3RD NERVE PALSY, COMPLETE, RIGHT: ICD-10-CM

## 2018-01-01 DIAGNOSIS — B35.1 ONYCHOMYCOSIS: ICD-10-CM

## 2018-01-01 DIAGNOSIS — M50.30 BULGING OF CERVICAL INTERVERTEBRAL DISC: Primary | ICD-10-CM

## 2018-01-01 DIAGNOSIS — M54.2 CERVICAL PAIN: ICD-10-CM

## 2018-01-01 DIAGNOSIS — M54.2 CERVICAL PAIN: Primary | ICD-10-CM

## 2018-01-01 DIAGNOSIS — N93.9 ABNORMAL VAGINAL BLEEDING: ICD-10-CM

## 2018-01-01 DIAGNOSIS — M25.561 PAIN IN BOTH KNEES, UNSPECIFIED CHRONICITY: Primary | ICD-10-CM

## 2018-01-01 DIAGNOSIS — Z12.39 BREAST CANCER SCREENING: Primary | ICD-10-CM

## 2018-01-01 LAB
ACE SERPL-CCNC: 31 U/L (ref 14–82)
ACHR AB SER-SCNC: <0.03 NMOL/L (ref 0–0.24)
ACHR BLOCK AB/ACHR TOTAL SFR SER: 23 % (ref 0–25)
ALBUMIN SERPL-MCNC: 3.9 G/DL (ref 3.5–5)
ALBUMIN SERPL-MCNC: 4.1 G/DL (ref 3.5–5)
ALBUMIN SERPL-MCNC: 4.4 G/DL (ref 3.5–5)
ALBUMIN/GLOB SERPL: 1.2 G/DL (ref 1–2)
ALBUMIN/GLOB SERPL: 1.3 G/DL (ref 1–2)
ALBUMIN/GLOB SERPL: 1.4 G/DL (ref 1–2)
ALP SERPL-CCNC: 103 U/L (ref 38–126)
ALP SERPL-CCNC: 88 U/L (ref 38–126)
ALP SERPL-CCNC: 99 U/L (ref 38–126)
ALT SERPL W P-5'-P-CCNC: 34 U/L (ref 13–69)
ALT SERPL W P-5'-P-CCNC: 35 U/L (ref 13–69)
ALT SERPL W P-5'-P-CCNC: 50 U/L (ref 13–69)
ANA SER QL IA: POSITIVE
ANA SPECKLED TITR SER: NORMAL {TITER}
ANION GAP SERPL CALCULATED.3IONS-SCNC: 15.1 MMOL/L (ref 10–20)
ANION GAP SERPL CALCULATED.3IONS-SCNC: 16.7 MMOL/L (ref 10–20)
ANION GAP SERPL CALCULATED.3IONS-SCNC: 19.4 MMOL/L
APTT HEX PL PPP: 0 SEC
APTT HEX PL PPP: 5 SEC (ref 0–11)
APTT IMM NP PPP: ABNORMAL SEC
APTT PPP 1:1 SALINE: ABNORMAL SEC
APTT PPP: 26.8 SEC (ref 22.9–30.2)
APTT PPP: 27.2 SEC
ASO AB SERPL-ACNC: NEGATIVE [IU]/ML
AST SERPL-CCNC: 20 U/L (ref 15–46)
AST SERPL-CCNC: 26 U/L (ref 15–46)
AST SERPL-CCNC: 38 U/L (ref 15–46)
B2 GLYCOPROT1 IGA SER-ACNC: <10 SAU
B2 GLYCOPROT1 IGG SER-ACNC: <10 SGU
B2 GLYCOPROT1 IGG SER-ACNC: <9 GPI IGG UNITS (ref 0–20)
B2 GLYCOPROT1 IGM SER-ACNC: <10 SMU
B2 GLYCOPROT1 IGM SER-ACNC: <9 GPI IGM UNITS (ref 0–32)
BASOPHILS # BLD AUTO: 0.05 10*3/MM3 (ref 0–0.2)
BASOPHILS # BLD AUTO: 0.06 10*3/MM3 (ref 0–0.2)
BASOPHILS # BLD AUTO: 0.09 10*3/MM3 (ref 0–0.2)
BASOPHILS NFR BLD AUTO: 0.5 % (ref 0–2.5)
BASOPHILS NFR BLD AUTO: 0.5 % (ref 0–2.5)
BASOPHILS NFR BLD AUTO: 0.6 % (ref 0–2.5)
BILIRUB SERPL-MCNC: 0.4 MG/DL (ref 0.2–1.3)
BILIRUB SERPL-MCNC: 0.5 MG/DL (ref 0.2–1.3)
BILIRUB SERPL-MCNC: 0.5 MG/DL (ref 0.2–1.3)
BILIRUB UR QL STRIP: NEGATIVE
BUN BLD-MCNC: 40 MG/DL (ref 7–20)
BUN BLD-MCNC: 47 MG/DL (ref 7–20)
BUN BLD-MCNC: 63 MG/DL (ref 7–20)
BUN/CREAT SERPL: 26.7 (ref 7.1–23.5)
BUN/CREAT SERPL: 27.4 (ref 7.1–23.5)
BUN/CREAT SERPL: 36.2 (ref 7.1–23.5)
CALCIUM SPEC-SCNC: 10.2 MG/DL (ref 8.4–10.2)
CALCIUM SPEC-SCNC: 9.3 MG/DL (ref 8.4–10.2)
CALCIUM SPEC-SCNC: 9.6 MG/DL (ref 8.4–10.2)
CARDIOLIPIN IGG SER IA-ACNC: <10 GPL
CARDIOLIPIN IGG SER IA-ACNC: <9 GPL U/ML (ref 0–14)
CARDIOLIPIN IGM SER IA-ACNC: 11 MPL
CARDIOLIPIN IGM SER IA-ACNC: 17 MPL U/ML (ref 0–12)
CHLORIDE SERPL-SCNC: 84 MMOL/L (ref 98–107)
CHLORIDE SERPL-SCNC: 86 MMOL/L (ref 98–107)
CHLORIDE SERPL-SCNC: 92 MMOL/L (ref 98–107)
CLARITY UR: CLEAR
CO2 SERPL-SCNC: 32 MMOL/L (ref 26–30)
CO2 SERPL-SCNC: 34 MMOL/L (ref 26–30)
CO2 SERPL-SCNC: 35 MMOL/L (ref 26–30)
COLOR UR: YELLOW
CONFIRM DRVVT: 38.2 SEC
CREAT BLD-MCNC: 1.3 MG/DL (ref 0.6–1.3)
CREAT BLD-MCNC: 1.5 MG/DL (ref 0.6–1.3)
CREAT BLD-MCNC: 2.3 MG/DL (ref 0.6–1.3)
CRP SERPL-MCNC: 5.7 MG/DL (ref 0–1)
DEPRECATED RDW RBC AUTO: 46.1 FL (ref 37–54)
DEPRECATED RDW RBC AUTO: 50.6 FL (ref 37–54)
DEPRECATED RDW RBC AUTO: 52.3 FL (ref 37–54)
ENA SS-A AB SER-ACNC: <0.2 AI (ref 0–0.9)
ENA SS-B AB SER-ACNC: <0.2 AI (ref 0–0.9)
EOSINOPHIL # BLD AUTO: 0.19 10*3/MM3 (ref 0–0.7)
EOSINOPHIL # BLD AUTO: 0.2 10*3/MM3 (ref 0–0.7)
EOSINOPHIL # BLD AUTO: 0.21 10*3/MM3 (ref 0–0.7)
EOSINOPHIL NFR BLD AUTO: 1.2 % (ref 0–7)
EOSINOPHIL NFR BLD AUTO: 1.6 % (ref 0–7)
EOSINOPHIL NFR BLD AUTO: 1.9 % (ref 0–7)
ERYTHROCYTE [DISTWIDTH] IN BLOOD BY AUTOMATED COUNT: 15.2 % (ref 11.5–14.5)
ERYTHROCYTE [DISTWIDTH] IN BLOOD BY AUTOMATED COUNT: 15.3 % (ref 11.5–14.5)
ERYTHROCYTE [DISTWIDTH] IN BLOOD BY AUTOMATED COUNT: 18.2 % (ref 11.5–14.5)
ERYTHROCYTE [SEDIMENTATION RATE] IN BLOOD: 67 MM/HR (ref 0–20)
GFR SERPL CREATININE-BSD FRML MDRD: 22 ML/MIN/1.73
GFR SERPL CREATININE-BSD FRML MDRD: 37 ML/MIN/1.73
GFR SERPL CREATININE-BSD FRML MDRD: 43 ML/MIN/1.73
GLOBULIN UR ELPH-MCNC: 3.1 GM/DL
GLOBULIN UR ELPH-MCNC: 3.1 GM/DL
GLOBULIN UR ELPH-MCNC: 3.2 GM/DL
GLUCOSE BLD-MCNC: 156 MG/DL (ref 74–98)
GLUCOSE BLD-MCNC: 166 MG/DL (ref 74–98)
GLUCOSE BLD-MCNC: 240 MG/DL (ref 74–98)
GLUCOSE UR STRIP-MCNC: NEGATIVE MG/DL
HCT VFR BLD AUTO: 39.8 % (ref 37–47)
HCT VFR BLD AUTO: 40.2 % (ref 37–47)
HCT VFR BLD AUTO: 40.8 % (ref 37–47)
HGB BLD-MCNC: 12.5 G/DL (ref 12–16)
HGB BLD-MCNC: 13.1 G/DL (ref 12–16)
HGB BLD-MCNC: 13.2 G/DL (ref 12–16)
HGB UR QL STRIP.AUTO: NEGATIVE
IMM GRANULOCYTES # BLD: 0.05 10*3/MM3 (ref 0–0.06)
IMM GRANULOCYTES # BLD: 0.1 10*3/MM3 (ref 0–0.06)
IMM GRANULOCYTES # BLD: 0.28 10*3/MM3 (ref 0–0.06)
IMM GRANULOCYTES NFR BLD: 0.5 % (ref 0–0.6)
IMM GRANULOCYTES NFR BLD: 0.8 % (ref 0–0.6)
IMM GRANULOCYTES NFR BLD: 1.7 % (ref 0–0.6)
INR PPP: 1 (ref 0.9–1.1)
INR PPP: 1.1 RATIO
INTERPRETATION: ABNORMAL
KETONES UR QL STRIP: NEGATIVE
LAC INTERPRETATION: ABNORMAL
LEUKOCYTE ESTERASE UR QL STRIP.AUTO: NEGATIVE
LIPASE SERPL-CCNC: 341 U/L (ref 23–300)
LYMPHOCYTES # BLD AUTO: 1.76 10*3/MM3 (ref 0.6–3.4)
LYMPHOCYTES # BLD AUTO: 2.3 10*3/MM3 (ref 0.6–3.4)
LYMPHOCYTES # BLD AUTO: 2.84 10*3/MM3 (ref 0.6–3.4)
LYMPHOCYTES NFR BLD AUTO: 16.2 % (ref 10–50)
LYMPHOCYTES NFR BLD AUTO: 17.7 % (ref 10–50)
LYMPHOCYTES NFR BLD AUTO: 17.9 % (ref 10–50)
Lab: NORMAL
MAGNESIUM SERPL-MCNC: 1.8 MG/DL (ref 1.6–2.3)
MCH RBC QN AUTO: 27.2 PG (ref 27–31)
MCH RBC QN AUTO: 27.7 PG (ref 27–31)
MCH RBC QN AUTO: 28.4 PG (ref 27–31)
MCHC RBC AUTO-ENTMCNC: 30.6 G/DL (ref 30–37)
MCHC RBC AUTO-ENTMCNC: 32.6 G/DL (ref 30–37)
MCHC RBC AUTO-ENTMCNC: 33.2 G/DL (ref 30–37)
MCV RBC AUTO: 83.4 FL (ref 81–99)
MCV RBC AUTO: 85.6 FL (ref 81–99)
MCV RBC AUTO: 90.3 FL (ref 81–99)
MONOCYTES # BLD AUTO: 0.75 10*3/MM3 (ref 0–0.9)
MONOCYTES # BLD AUTO: 0.88 10*3/MM3 (ref 0–0.9)
MONOCYTES # BLD AUTO: 1.23 10*3/MM3 (ref 0–0.9)
MONOCYTES NFR BLD AUTO: 6.8 % (ref 0–12)
MONOCYTES NFR BLD AUTO: 6.9 % (ref 0–12)
MONOCYTES NFR BLD AUTO: 7.7 % (ref 0–12)
NEUTROPHILS # BLD AUTO: 11.4 10*3/MM3 (ref 2–6.9)
NEUTROPHILS # BLD AUTO: 8.07 10*3/MM3 (ref 2–6.9)
NEUTROPHILS # BLD AUTO: 9.34 10*3/MM3 (ref 2–6.9)
NEUTROPHILS NFR BLD AUTO: 71.1 % (ref 37–80)
NEUTROPHILS NFR BLD AUTO: 72.4 % (ref 37–80)
NEUTROPHILS NFR BLD AUTO: 74 % (ref 37–80)
NITRITE UR QL STRIP: NEGATIVE
NRBC BLD MANUAL-RTO: 0 /100 WBC (ref 0–0)
NRBC BLD MANUAL-RTO: 0 /100 WBC (ref 0–0)
NRBC BLD MANUAL-RTO: 0.2 /100 WBC (ref 0–0)
PH UR STRIP.AUTO: 7 [PH] (ref 5–8)
PLATELET # BLD AUTO: 182 10*3/MM3 (ref 130–400)
PLATELET # BLD AUTO: 213 10*3/MM3 (ref 130–400)
PLATELET # BLD AUTO: 218 10*3/MM3 (ref 130–400)
PLATELET NEUTRALIZATION: 3.4 SEC
PMV BLD AUTO: 10.3 FL (ref 6–12)
PMV BLD AUTO: 11.2 FL (ref 6–12)
PMV BLD AUTO: 9.4 FL (ref 6–12)
POTASSIUM BLD-SCNC: 2.7 MMOL/L (ref 3.5–5.1)
POTASSIUM BLD-SCNC: 3.1 MMOL/L (ref 3.5–5.1)
POTASSIUM BLD-SCNC: 4.4 MMOL/L (ref 3.5–5.1)
PROT SERPL-MCNC: 7.1 G/DL (ref 6.3–8.2)
PROT SERPL-MCNC: 7.2 G/DL (ref 6.3–8.2)
PROT SERPL-MCNC: 7.5 G/DL (ref 6.3–8.2)
PROT UR QL STRIP: NEGATIVE
PROTHROMBIN TIME: 11.2 SEC (ref 9.6–11.5)
PROTHROMBIN TIME: 11.4 SEC
RBC # BLD AUTO: 4.52 10*6/MM3 (ref 4.2–5.4)
RBC # BLD AUTO: 4.65 10*6/MM3 (ref 4.2–5.4)
RBC # BLD AUTO: 4.82 10*6/MM3 (ref 4.2–5.4)
RHEUMATOID FACT SERPL-ACNC: POSITIVE [IU]/ML
RHEUMATOID FACT TITR SER: NORMAL IU/ML
SCREEN DRVVT/NORMAL: 1.4 RATIO
SCREEN DRVVT: 1.5 RATIO (ref 0.8–1.2)
SCREEN DRVVT: 58.6 SEC (ref 0–47)
SCREEN DRVVT: 59.3 SEC
SODIUM BLD-SCNC: 131 MMOL/L (ref 137–145)
SODIUM BLD-SCNC: 134 MMOL/L (ref 137–145)
SODIUM BLD-SCNC: 139 MMOL/L (ref 137–145)
SP GR UR STRIP: 1.01 (ref 1–1.03)
THROMBIN TIME: 15.5 SEC (ref 0–23)
THROMBIN TIME: 16.6 SEC
UROBILINOGEN UR QL STRIP: NORMAL
VW INTERPRETATION: NORMAL
WBC NRBC COR # BLD: 10.89 10*3/MM3 (ref 4.8–10.8)
WBC NRBC COR # BLD: 12.88 10*3/MM3 (ref 4.8–10.8)
WBC NRBC COR # BLD: 16.03 10*3/MM3 (ref 4.8–10.8)

## 2018-01-01 PROCEDURE — 99213 OFFICE O/P EST LOW 20 MIN: CPT | Performed by: PODIATRIST

## 2018-01-01 PROCEDURE — 86430 RHEUMATOID FACTOR TEST QUAL: CPT

## 2018-01-01 PROCEDURE — 86063 ANTISTREPTOLYSIN O SCREEN: CPT

## 2018-01-01 PROCEDURE — 86235 NUCLEAR ANTIGEN ANTIBODY: CPT

## 2018-01-01 PROCEDURE — 85598 HEXAGNAL PHOSPH PLTLT NEUTRL: CPT

## 2018-01-01 PROCEDURE — 85613 RUSSELL VIPER VENOM DILUTED: CPT

## 2018-01-01 PROCEDURE — 86146 BETA-2 GLYCOPROTEIN ANTIBODY: CPT

## 2018-01-01 PROCEDURE — 70551 MRI BRAIN STEM W/O DYE: CPT

## 2018-01-01 PROCEDURE — 83519 RIA NONANTIBODY: CPT

## 2018-01-01 PROCEDURE — 80053 COMPREHEN METABOLIC PANEL: CPT | Performed by: EMERGENCY MEDICINE

## 2018-01-01 PROCEDURE — 73030 X-RAY EXAM OF SHOULDER: CPT

## 2018-01-01 PROCEDURE — 99214 OFFICE O/P EST MOD 30 MIN: CPT | Performed by: PODIATRIST

## 2018-01-01 PROCEDURE — 85651 RBC SED RATE NONAUTOMATED: CPT

## 2018-01-01 PROCEDURE — 36415 COLL VENOUS BLD VENIPUNCTURE: CPT

## 2018-01-01 PROCEDURE — 29580 STRAPPING UNNA BOOT: CPT | Performed by: PODIATRIST

## 2018-01-01 PROCEDURE — 86147 CARDIOLIPIN ANTIBODY EA IG: CPT

## 2018-01-01 PROCEDURE — 11721 DEBRIDE NAIL 6 OR MORE: CPT | Performed by: PODIATRIST

## 2018-01-01 PROCEDURE — 99204 OFFICE O/P NEW MOD 45 MIN: CPT | Performed by: ORTHOPAEDIC SURGERY

## 2018-01-01 PROCEDURE — 85670 THROMBIN TIME PLASMA: CPT

## 2018-01-01 PROCEDURE — 70544 MR ANGIOGRAPHY HEAD W/O DYE: CPT

## 2018-01-01 PROCEDURE — 86038 ANTINUCLEAR ANTIBODIES: CPT

## 2018-01-01 PROCEDURE — 25010000002 PROCHLORPERAZINE EDISYLATE PER 10 MG: Performed by: EMERGENCY MEDICINE

## 2018-01-01 PROCEDURE — 85025 COMPLETE CBC W/AUTO DIFF WBC: CPT

## 2018-01-01 PROCEDURE — 85597 PHOSPHOLIPID PLTLT NEUTRALIZ: CPT

## 2018-01-01 PROCEDURE — 86140 C-REACTIVE PROTEIN: CPT

## 2018-01-01 PROCEDURE — 99283 EMERGENCY DEPT VISIT LOW MDM: CPT

## 2018-01-01 PROCEDURE — 99243 OFF/OP CNSLTJ NEW/EST LOW 30: CPT | Performed by: NEUROLOGICAL SURGERY

## 2018-01-01 PROCEDURE — 25010000002 KETOROLAC TROMETHAMINE PER 15 MG: Performed by: EMERGENCY MEDICINE

## 2018-01-01 PROCEDURE — 81003 URINALYSIS AUTO W/O SCOPE: CPT | Performed by: EMERGENCY MEDICINE

## 2018-01-01 PROCEDURE — 99214 OFFICE O/P EST MOD 30 MIN: CPT | Performed by: PHYSICIAN ASSISTANT

## 2018-01-01 PROCEDURE — 71046 X-RAY EXAM CHEST 2 VIEWS: CPT

## 2018-01-01 PROCEDURE — 85730 THROMBOPLASTIN TIME PARTIAL: CPT

## 2018-01-01 PROCEDURE — 82164 ANGIOTENSIN I ENZYME TEST: CPT

## 2018-01-01 PROCEDURE — 96365 THER/PROPH/DIAG IV INF INIT: CPT

## 2018-01-01 PROCEDURE — 73060 X-RAY EXAM OF HUMERUS: CPT

## 2018-01-01 PROCEDURE — 85025 COMPLETE CBC W/AUTO DIFF WBC: CPT | Performed by: EMERGENCY MEDICINE

## 2018-01-01 PROCEDURE — 80053 COMPREHEN METABOLIC PANEL: CPT

## 2018-01-01 PROCEDURE — 99214 OFFICE O/P EST MOD 30 MIN: CPT | Performed by: PSYCHIATRY & NEUROLOGY

## 2018-01-01 PROCEDURE — 85025 COMPLETE CBC W/AUTO DIFF WBC: CPT | Performed by: UROLOGY

## 2018-01-01 PROCEDURE — 72141 MRI NECK SPINE W/O DYE: CPT

## 2018-01-01 PROCEDURE — 85610 PROTHROMBIN TIME: CPT

## 2018-01-01 PROCEDURE — 83735 ASSAY OF MAGNESIUM: CPT | Performed by: EMERGENCY MEDICINE

## 2018-01-01 PROCEDURE — 83690 ASSAY OF LIPASE: CPT | Performed by: EMERGENCY MEDICINE

## 2018-01-01 PROCEDURE — 85732 THROMBOPLASTIN TIME PARTIAL: CPT

## 2018-01-01 PROCEDURE — 99214 OFFICE O/P EST MOD 30 MIN: CPT | Performed by: NURSE PRACTITIONER

## 2018-01-01 PROCEDURE — 25010000003 POTASSIUM CHLORIDE 10 MEQ/100ML SOLUTION: Performed by: EMERGENCY MEDICINE

## 2018-01-01 PROCEDURE — 96375 TX/PRO/DX INJ NEW DRUG ADDON: CPT

## 2018-01-01 PROCEDURE — 86431 RHEUMATOID FACTOR QUANT: CPT

## 2018-01-01 PROCEDURE — 80053 COMPREHEN METABOLIC PANEL: CPT | Performed by: UROLOGY

## 2018-01-01 PROCEDURE — 72148 MRI LUMBAR SPINE W/O DYE: CPT

## 2018-01-01 PROCEDURE — 25010000002 ONDANSETRON PER 1 MG: Performed by: EMERGENCY MEDICINE

## 2018-01-01 PROCEDURE — 25010000002 DIPHENHYDRAMINE PER 50 MG: Performed by: EMERGENCY MEDICINE

## 2018-01-01 PROCEDURE — 72146 MRI CHEST SPINE W/O DYE: CPT

## 2018-01-01 RX ORDER — CLOPIDOGREL BISULFATE 75 MG/1
75 TABLET ORAL
COMMUNITY

## 2018-01-01 RX ORDER — AMPICILLIN 500 MG/1
CAPSULE ORAL
Refills: 0 | COMMUNITY
Start: 2018-01-01 | End: 2018-01-01 | Stop reason: SDUPTHER

## 2018-01-01 RX ORDER — TIZANIDINE 4 MG/1
TABLET ORAL
Qty: 90 TABLET | Refills: 0 | Status: SHIPPED | OUTPATIENT
Start: 2018-01-01 | End: 2018-01-01 | Stop reason: SDUPTHER

## 2018-01-01 RX ORDER — TIZANIDINE 4 MG/1
TABLET ORAL
Refills: 0 | COMMUNITY
Start: 2018-01-01

## 2018-01-01 RX ORDER — SPIRONOLACTONE 50 MG/1
50 TABLET, FILM COATED ORAL
COMMUNITY

## 2018-01-01 RX ORDER — POTASSIUM CHLORIDE 750 MG/1
20 CAPSULE, EXTENDED RELEASE ORAL ONCE
Status: COMPLETED | OUTPATIENT
Start: 2018-01-01 | End: 2018-01-01

## 2018-01-01 RX ORDER — BUTALBITAL, ACETAMINOPHEN AND CAFFEINE 50; 325; 40 MG/1; MG/1; MG/1
1 TABLET ORAL EVERY 6 HOURS PRN
Qty: 10 TABLET | Refills: 0 | Status: SHIPPED | OUTPATIENT
Start: 2018-01-01

## 2018-01-01 RX ORDER — TIZANIDINE 4 MG/1
TABLET ORAL
Qty: 90 TABLET | Refills: 0 | OUTPATIENT
Start: 2018-01-01

## 2018-01-01 RX ORDER — SILVER SULFADIAZINE 1 %
CREAM (GRAM) TOPICAL
COMMUNITY
Start: 2018-01-01

## 2018-01-01 RX ORDER — LANCETS 33 GAUGE
EACH MISCELLANEOUS
COMMUNITY
Start: 2018-01-01

## 2018-01-01 RX ORDER — FUROSEMIDE 20 MG/1
20 TABLET ORAL
COMMUNITY

## 2018-01-01 RX ORDER — DIPHENHYDRAMINE HYDROCHLORIDE 50 MG/ML
25 INJECTION INTRAMUSCULAR; INTRAVENOUS ONCE
Status: COMPLETED | OUTPATIENT
Start: 2018-01-01 | End: 2018-01-01

## 2018-01-01 RX ORDER — KETOROLAC TROMETHAMINE 30 MG/ML
15 INJECTION, SOLUTION INTRAMUSCULAR; INTRAVENOUS ONCE
Status: COMPLETED | OUTPATIENT
Start: 2018-01-01 | End: 2018-01-01

## 2018-01-01 RX ORDER — FEBUXOSTAT 40 MG/1
60 TABLET, FILM COATED ORAL
COMMUNITY

## 2018-01-01 RX ORDER — LISINOPRIL 20 MG/1
2.5 TABLET ORAL
COMMUNITY

## 2018-01-01 RX ORDER — MINOCYCLINE HYDROCHLORIDE 100 MG/1
CAPSULE ORAL
Refills: 0 | COMMUNITY
Start: 2018-01-01

## 2018-01-01 RX ORDER — BLOOD SUGAR DIAGNOSTIC
STRIP MISCELLANEOUS
COMMUNITY
Start: 2018-01-01

## 2018-01-01 RX ORDER — NITROFURANTOIN 25; 75 MG/1; MG/1
CAPSULE ORAL
Refills: 0 | COMMUNITY
Start: 2018-01-01

## 2018-01-01 RX ORDER — AMPICILLIN 500 MG/1
500 CAPSULE ORAL 4 TIMES DAILY
Qty: 60 CAPSULE | Refills: 2 | Status: SHIPPED | OUTPATIENT
Start: 2018-01-01

## 2018-01-01 RX ORDER — LINEZOLID 600 MG/1
TABLET, FILM COATED ORAL
COMMUNITY
Start: 2018-01-01

## 2018-01-01 RX ORDER — COLCHICINE 0.6 MG/1
TABLET ORAL
Refills: 0 | COMMUNITY
Start: 2018-01-01

## 2018-01-01 RX ORDER — OXYBUTYNIN CHLORIDE 10 MG/1
TABLET, EXTENDED RELEASE ORAL
COMMUNITY
Start: 2018-01-01

## 2018-01-01 RX ORDER — IPRATROPIUM BROMIDE 21 UG/1
SPRAY, METERED NASAL
Refills: 0 | COMMUNITY
Start: 2017-01-01

## 2018-01-01 RX ORDER — PENICILLIN V POTASSIUM 500 MG/1
TABLET ORAL
Refills: 0 | COMMUNITY
Start: 2018-01-01

## 2018-01-01 RX ORDER — FEBUXOSTAT 80 MG/1
TABLET ORAL
Refills: 3 | COMMUNITY
Start: 2017-01-01

## 2018-01-01 RX ORDER — TIZANIDINE 4 MG/1
TABLET ORAL
Qty: 90 TABLET | Refills: 0 | Status: SHIPPED | OUTPATIENT
Start: 2018-01-01 | End: 2018-01-01

## 2018-01-01 RX ORDER — ONDANSETRON 2 MG/ML
4 INJECTION INTRAMUSCULAR; INTRAVENOUS ONCE
Status: COMPLETED | OUTPATIENT
Start: 2018-01-01 | End: 2018-01-01

## 2018-01-01 RX ORDER — DIAZEPAM 10 MG/1
TABLET ORAL
COMMUNITY
Start: 2018-01-01

## 2018-01-01 RX ORDER — CIPROFLOXACIN 500 MG/1
TABLET, FILM COATED ORAL
Refills: 0 | COMMUNITY
Start: 2018-01-01

## 2018-01-01 RX ORDER — CEPHALEXIN 500 MG/1
500 CAPSULE ORAL EVERY 12 HOURS
Qty: 30 CAPSULE | Refills: 0 | Status: SHIPPED | OUTPATIENT
Start: 2018-01-01

## 2018-01-01 RX ORDER — POTASSIUM CHLORIDE 750 MG/1
TABLET, EXTENDED RELEASE ORAL
Refills: 2 | COMMUNITY
Start: 2018-01-01 | End: 2018-01-01

## 2018-01-01 RX ORDER — BACLOFEN 10 MG/1
10 TABLET ORAL 3 TIMES DAILY
Qty: 90 TABLET | Refills: 1 | Status: SHIPPED | OUTPATIENT
Start: 2018-01-01

## 2018-01-01 RX ORDER — METHYLPREDNISOLONE 4 MG/1
TABLET ORAL
Qty: 1 EACH | Refills: 0 | Status: SHIPPED | OUTPATIENT
Start: 2018-01-01

## 2018-01-01 RX ORDER — ONDANSETRON 4 MG/1
4 TABLET, ORALLY DISINTEGRATING ORAL EVERY 8 HOURS PRN
Qty: 10 TABLET | Refills: 0 | Status: SHIPPED | OUTPATIENT
Start: 2018-01-01

## 2018-01-01 RX ORDER — CHLORTHALIDONE 25 MG/1
25 TABLET ORAL
COMMUNITY
End: 2018-01-01 | Stop reason: DRUGHIGH

## 2018-01-01 RX ORDER — POTASSIUM CHLORIDE 7.45 MG/ML
10 INJECTION INTRAVENOUS ONCE
Status: COMPLETED | OUTPATIENT
Start: 2018-01-01 | End: 2018-01-01

## 2018-01-01 RX ORDER — POTASSIUM CHLORIDE 20 MEQ/1
20 TABLET, EXTENDED RELEASE ORAL 2 TIMES DAILY
Qty: 14 TABLET | Refills: 0 | Status: SHIPPED | OUTPATIENT
Start: 2018-01-01

## 2018-01-01 RX ORDER — CHLORTHALIDONE 50 MG/1
TABLET ORAL
Refills: 3 | COMMUNITY
Start: 2018-01-01

## 2018-01-01 RX ORDER — HYDROCODONE BITARTRATE AND ACETAMINOPHEN 5; 325 MG/1; MG/1
TABLET ORAL
COMMUNITY

## 2018-01-01 RX ADMIN — PROCHLORPERAZINE EDISYLATE 10 MG: 5 INJECTION INTRAMUSCULAR; INTRAVENOUS at 18:24

## 2018-01-01 RX ADMIN — POTASSIUM CHLORIDE 10 MEQ: 10 INJECTION, SOLUTION INTRAVENOUS at 19:40

## 2018-01-01 RX ADMIN — POTASSIUM CHLORIDE 20 MEQ: 750 CAPSULE, EXTENDED RELEASE ORAL at 19:40

## 2018-01-01 RX ADMIN — ONDANSETRON 4 MG: 2 INJECTION INTRAMUSCULAR; INTRAVENOUS at 19:40

## 2018-01-01 RX ADMIN — PRAMIPEXOLE DIHYDROCHLORIDE 1.5 MG: 1 TABLET ORAL at 19:41

## 2018-01-01 RX ADMIN — DIPHENHYDRAMINE HYDROCHLORIDE 25 MG: 50 INJECTION, SOLUTION INTRAMUSCULAR; INTRAVENOUS at 18:23

## 2018-01-01 RX ADMIN — KETOROLAC TROMETHAMINE 15 MG: 30 INJECTION, SOLUTION INTRAMUSCULAR; INTRAVENOUS at 18:24

## 2018-01-02 NOTE — TELEPHONE ENCOUNTER
SALOMON WITH [facility] CALLED TO LET US KNOW THAT HE HAS DISCHARGED MS. RONQUILLO. HE HAS BEEN TRYING TO HELP HER WITH HER NECK PAIN.

## 2018-01-05 NOTE — PROGRESS NOTES
Subjective   Patient ID: Margarita Dominguez is a 52 y.o. female returns after not being seen since middle of October.  Complains of increased swelling and recurrence of wounds on the right leg.  Has a new wound on the left leg now.  Seems to be significantly short of breath.  States her breathing is labored and shallow and very difficult.  She says that normally in cold weather her breathing is much better.  Complains of pain in the legs and feet.      History of Present Illness                                                   Review of Systems   Constitutional: Negative for diaphoresis, fever and unexpected weight change.   HENT: Negative for dental problem and sore throat.    Eyes: Negative for visual disturbance.   Respiratory: Negative for shortness of breath.    Cardiovascular: Negative for chest pain.   Gastrointestinal: Negative for abdominal pain, constipation, diarrhea, nausea and vomiting.   Genitourinary: Negative for difficulty urinating and frequency.   Skin: Positive for wound.   Neurological: Negative for headaches.   Hematological: Does not bruise/bleed easily.   All other systems reviewed and are negative.      Past Medical History:   Diagnosis Date   • Anxiety    • Arthritis    • Asthma    • Back pain 2015   • Chronic pain    • COPD (chronic obstructive pulmonary disease)    • Depression    • Diabetes mellitus 2012   • Esophageal spasm    • Fibromyalgia    • GERD (gastroesophageal reflux disease)    • Heart murmur    • Hyperlipidemia    • Hypertension    • IBS (irritable bowel syndrome)    • Joint pain    • Migraines    • Morbid obesity 8/22/2016   • Muscle spasm    • Obstructive sleep apnea    • Osteoarthritis    • Restless legs syndrome    • Sleep apnea 2013   • Stress incontinence    • Syncope 8/22/2016    onset in summer 2015: “Negative” neurologic evaluation with negative CT and EEG.   • Tobacco use disorder    • Vertigo 2015   • Vitamin D deficiency         Past Surgical History:    Procedure Laterality Date   • APPENDECTOMY  1976   • BACK SURGERY  2015   • CARDIAC CATHETERIZATION  2014   • COLONOSCOPY  2017   • HYSTERECTOMY  2011   • ILIAC ARTERY STENT     • INTERVENTIONAL RADIOLOGY PROCEDURE Bilateral 8/18/2017   • INTERVENTIONAL RADIOLOGY PROCEDURE N/A 8/18/2017   • INTERVENTIONAL RADIOLOGY PROCEDURE Right 10/6/2017    Procedure: IR venogram lower extremity right;  Surgeon: Hayden Romo MD;  Location: [facility] CATH INVASIVE LOCATION;  Service:    • INTERVENTIONAL RADIOLOGY PROCEDURE N/A 10/6/2017    Procedure: IR intravascular ultrasound each additional non coronary vessel;  Surgeon: Hayden Romo MD;  Location: [facility] CATH INVASIVE LOCATION;  Service:    • KNEE ARTHROSCOPY Right 2015   • MOUTH SURGERY     • TONSILLECTOMY  1970       Social History     Social History   • Marital status:      Spouse name: Leon   • Number of children: N/A   • Years of education: N/A     Occupational History   • Not on file.     Social History Main Topics   • Smoking status: Current Some Day Smoker     Packs/day: 1.00     Years: 30.00     Types: Cigarettes   • Smokeless tobacco: Never Used   • Alcohol use Yes      Comment: socially   • Drug use: No   • Sexual activity: Defer     Other Topics Concern   • Not on file     Social History Narrative       Ready to quit: Not Answered  Counseling given: Not Answered      I have reviewed all of the above social hx, family hx, surgical hx, medications, allergies & ROS and confirm that it is accurate.    Allergies   Allergen Reactions   • Morphine Shortness Of Breath   • Morphine And Related    • Adhesive Tape Rash     Bandaging tape   • Bactrim [Sulfamethoxazole-Trimethoprim] Nausea And Vomiting     diarrhea       Objective   Physical Exam   Constitutional: She is oriented to person, place, and time. She appears well-developed and well-nourished.   HENT:   Head: Normocephalic and atraumatic.   Eyes: EOM are normal. Pupils are equal, round,  and reactive to light.   Neck: Normal range of motion.   Cardiovascular: Intact distal pulses.    Pulmonary/Chest: She is in respiratory distress. She has wheezes. She exhibits tenderness.   Abdominal: Soft. There is tenderness.   Musculoskeletal: Normal range of motion.   Neurological: She is alert and oriented to person, place, and time. She has normal reflexes.   Skin: Skin is warm.   Psychiatric: She has a normal mood and affect. Her behavior is normal. Judgment and thought content normal.   Nursing note reviewed.    Ortho Exam  Ortho Exam  Lateral lower extremities show 4+ tense pitting edema from the knees to the toes.  She has been recurrent open areas on the anterior aspect of the distal legs bilaterally with large areas approximately 8-10 cm in diameter with weeping.  There is slight redness and warmth.  Legs and feet are very tender to palpation.  Pulses are nonpalpable due to the edema but the feet are warm with capillary refill time less than 5 seconds.      Assessment/Plan  fluid overload, probable CHF exacerbation, bilateral lower extremity edema, bilateral lower extremity wounds.  Independent Review of Radiographic Studies:      Laboratory and Other Studies:     Medical Decision Making:        Procedures  Debridement Note    Unna boots were applied to bilateral lower extremities from the toes to the knees.    Margarita was seen today for follow-up and cellulitis.    Diagnoses and all orders for this visit:    Localized edema  -     Ambulatory Referral to Home Health    Cellulitis and abscess of leg    Ulcer of foot, right, limited to breakdown of skin    Venous insufficiency (chronic) (peripheral)    Type 2 diabetes mellitus with other skin ulcer, with long-term current use of insulin    Edema of extremity of unknown cause    Wound of right ankle, sequela        Recommendations/Plan:  I will restart home health on her.  I explained that due to the extreme edema of this will most likely need to be changed  every 2-3 days initially until we get this under control.  I also recommend she discuss her Lasix dosing with her primary care physician that she may need to have this increased or I honestly think she needs hospital admission for IV diaphoresis, especially if her breathing continues to decrease.  I recommend she keep a close eye on this and if this worsens she should go to the emergency department or notify her PCP immediately.  I like to see her back in one week.    Return in about 1 week (around 1/12/2018).  Patient agreeable to call or return sooner for any concerns.

## 2018-01-15 NOTE — PROGRESS NOTES
Subjective   Chief Complaint   Patient presents with   • Gynecologic Exam     Had bleeding 9 days ago, had Hysterectomy in , had some vaginal irritation and  discharge       Margarita Dominguez is a 52 y.o. year old  presenting to be seen for possible vaginal bleeding  Patient had hysterectomy BSO -she is on estrace 2mg  Had a normal pap 2017  She reports that 9 days ago she had one episode of bleeding and passed small clot  She is unsure if bleeding was actually from vagina but does not think was rectal bleeding  She reports she was recently diagnosed with UTI per Dr Fam and is on antibiotic and also diflucan for vaginal yeast  Has had no pelvic pain   Has seen no further bleeding since 9 days ago    Past Medical History:   Diagnosis Date   • Anxiety    • Arthritis    • Asthma    • Back pain    • Chronic pain    • COPD (chronic obstructive pulmonary disease)    • Depression    • Diabetes mellitus    • Esophageal spasm    • Fibromyalgia    • GERD (gastroesophageal reflux disease)    • Heart murmur    • Hyperlipidemia    • Hypertension    • IBS (irritable bowel syndrome)    • Joint pain    • Migraines    • Morbid obesity 2016   • Muscle spasm    • Obstructive sleep apnea    • Osteoarthritis    • Restless legs syndrome    • Sleep apnea    • Stress incontinence    • Syncope 2016    onset in summer 2015: “Negative” neurologic evaluation with negative CT and EEG.   • Tobacco use disorder    • Vertigo    • Vitamin D deficiency         Current Outpatient Prescriptions:   •  albuterol (PROVENTIL HFA;VENTOLIN HFA) 108 (90 BASE) MCG/ACT inhaler, Inhale 1-2 puffs every 6 (six) hours as needed., Disp: , Rfl:   •  aspirin  MG EC tablet, Take 1 tablet by mouth daily., Disp: , Rfl:   •  Biotin 5 MG capsule, Take 5 mg by mouth 2 (Two) Times a Day., Disp: , Rfl:   •  butalbital-acetaminophen-caffeine (FIORICET) -40 MG per tablet, Take 1 tablet by mouth 2 (Two) Times a  Day As Needed for headaches., Disp: , Rfl:   •  CHANTIX STARTING MONTH FERNANDEZ 0.5 MG X 11 & 1 MG X 42 tablet, TK UTD, Disp: , Rfl: 0  •  chlorthalidone (HYGROTON) 25 MG tablet, TK 1 T PO BID, Disp: , Rfl: 2  •  chlorthalidone (HYGROTON) 25 MG tablet, Take 25 mg by mouth., Disp: , Rfl:   •  clonazePAM (KlonoPIN) 0.5 MG tablet, Take 0.5 mg by mouth Every 12 (Twelve) Hours As Needed., Disp: , Rfl:   •  clopidogrel (PLAVIX) 75 MG tablet, TK 1 T PO QD, Disp: , Rfl: 3  •  clopidogrel (PLAVIX) 75 MG tablet, Take 75 mg by mouth., Disp: , Rfl:   •  colestipol (COLESTID) 1 G tablet, Take 1 g by mouth 2 (Two) Times a Day., Disp: , Rfl:   •  dexlansoprazole (DEXILANT) 60 MG capsule, Take 60 mg by mouth Every Night., Disp: , Rfl:   •  EPINEPHrine (EPIPEN) 0.3 MG/0.3ML solution auto-injector injection, Inject 0.3 mL into the shoulder, thigh, or buttocks., Disp: , Rfl:   •  estradiol (ESTRACE) 1 MG tablet, Take 2 mg by mouth Daily., Disp: , Rfl:   •  estradiol (ESTRACE) 2 MG tablet, TK 1 T PO QD, Disp: , Rfl: 5  •  febuxostat (ULORIC) 40 MG tablet, Take 60 mg by mouth., Disp: , Rfl:   •  fluconazole (DIFLUCAN) 100 MG tablet, TK 1 T PO QD, Disp: , Rfl: 0  •  Fluticasone Furoate-Vilanterol (BREO ELLIPTA) 200-25 MCG/INH aerosol powder , Inhale 2 puffs Daily., Disp: , Rfl:   •  Fluticasone Furoate-Vilanterol 200-25 MCG/INH aerosol powder , Inhale., Disp: , Rfl:   •  furosemide (LASIX) 20 MG tablet, Take 20 mg by mouth., Disp: , Rfl:   •  furosemide (LASIX) 40 MG tablet, Take 1 tablet by mouth 2 (Two) Times a Day. (Patient taking differently: Take 40 mg by mouth Daily.), Disp: 60 tablet, Rfl: 0  •  HYDROcodone-acetaminophen (NORCO) 5-325 MG per tablet, Take  by mouth., Disp: , Rfl:   •  HYDROcodone-acetaminophen (NORCO) 7.5-325 MG per tablet, TK 1 T PO Q 8-12 H PRN P, Disp: , Rfl: 0  •  INCRUSE ELLIPTA 62.5 MCG/INH aerosol powder , , Disp: , Rfl:   •  ipratropium (ATROVENT) 0.03 % nasal spray, USE 1 SPRAY IN EACH NOSTRIL BID, Disp: , Rfl:  0  •  ipratropium (ATROVENT) 0.06 % nasal spray, 2 sprays into each nostril 4 (Four) Times a Day., Disp: , Rfl:   •  levalbuterol (XOPENEX) 1.25 MG/3ML nebulizer solution, Take 1 ampule by nebulization Every 8 (Eight) Hours As Needed for wheezing., Disp: , Rfl:   •  levocetirizine (XYZAL) 5 MG tablet, Take 5 mg by mouth Daily., Disp: , Rfl:   •  metOLazone (ZAROXOLYN) 2.5 MG tablet, Take 2.5 mg by mouth Daily., Disp: , Rfl:   •  metoprolol tartrate (LOPRESSOR) 25 MG tablet, TK 1 T PO BID, Disp: , Rfl: 2  •  metoprolol tartrate (LOPRESSOR) 25 MG tablet, Take 25 mg by mouth., Disp: , Rfl:   •  minocycline (MINOCIN,DYNACIN) 100 MG capsule, , Disp: , Rfl: 0  •  montelukast (SINGULAIR) 10 MG tablet, Take 10 mg by mouth Daily., Disp: , Rfl:   •  nitroglycerin (NITROSTAT) 0.4 MG SL tablet, Place  under the tongue., Disp: , Rfl:   •  ondansetron ODT (ZOFRAN-ODT) 4 MG disintegrating tablet, DIS 1 T ON THE TONGUE TID PRF NAUSEA, Disp: , Rfl: 0  •  potassium chloride (MICRO-K) 10 MEQ CR capsule, Take 10 mEq by mouth 2 (Two) Times a Day., Disp: , Rfl: 2  •  pramipexole (MIRAPEX) 1.5 MG tablet, , Disp: , Rfl: 5  •  ranitidine (ZANTAC) 300 MG tablet, Take 300 mg by mouth 2 (Two) Times a Day., Disp: , Rfl:   •  spironolactone (ALDACTONE) 50 MG tablet, Take 50 mg by mouth 2 (Two) Times a Day., Disp: , Rfl:   •  ULORIC 40 MG tablet, 40 mg Every Night., Disp: , Rfl: 3  •  clindamycin (CLEOCIN T) 1 % external solution, KEYONNA TO ABDOMEN BID UTD, Disp: , Rfl: 1  •  lisinopril (PRINIVIL,ZESTRIL) 10 MG tablet, Take 2.5 mg by mouth Daily., Disp: , Rfl: 2  •  lisinopril (PRINIVIL,ZESTRIL) 2.5 MG tablet, , Disp: , Rfl: 2  •  lisinopril (PRINIVIL,ZESTRIL) 20 MG tablet, Take 2.5 mg by mouth., Disp: , Rfl:   •  Potassium 75 MG tablet, Take  by mouth., Disp: , Rfl:   •  pseudoephedrine-guaifenesin (MUCINEX D)  MG per 12 hr tablet, Take 1 tablet by mouth As Needed for Allergies., Disp: , Rfl:   •  spironolactone (ALDACTONE) 50 MG tablet, Take  50 mg by mouth., Disp: , Rfl:   •  tiZANidine (ZANAFLEX) 4 MG tablet, TAKE 1 TABLET BY MOUTH THREE TIMES DAILY, Disp: 90 tablet, Rfl: 0  •  ULORIC 80 MG tablet tablet, TK 1 T PO D, Disp: , Rfl: 3   Allergies   Allergen Reactions   • Morphine Shortness Of Breath   • Morphine And Related    • Adhesive Tape Rash     Bandaging tape   • Bactrim [Sulfamethoxazole-Trimethoprim] Nausea And Vomiting     diarrhea      Past Surgical History:   Procedure Laterality Date   • APPENDECTOMY  1976   • BACK SURGERY  2015   • CARDIAC CATHETERIZATION  2014   • COLONOSCOPY  2017   • HYSTERECTOMY  2011   • ILIAC ARTERY STENT     • INTERVENTIONAL RADIOLOGY PROCEDURE Bilateral 8/18/2017   • INTERVENTIONAL RADIOLOGY PROCEDURE N/A 8/18/2017   • INTERVENTIONAL RADIOLOGY PROCEDURE Right 10/6/2017    Procedure: IR venogram lower extremity right;  Surgeon: Hayden Romo MD;  Location: [facility] CATH INVASIVE LOCATION;  Service:    • INTERVENTIONAL RADIOLOGY PROCEDURE N/A 10/6/2017    Procedure: IR intravascular ultrasound each additional non coronary vessel;  Surgeon: Hayden Romo MD;  Location: [facility] CATH INVASIVE LOCATION;  Service:    • KNEE ARTHROSCOPY Right 2015   • MOUTH SURGERY     • TONSILLECTOMY  1970      Social History     Social History   • Marital status:      Spouse name: Leon   • Number of children: N/A   • Years of education: N/A     Occupational History   • Not on file.     Social History Main Topics   • Smoking status: Current Some Day Smoker     Packs/day: 1.00     Years: 30.00     Types: Cigarettes   • Smokeless tobacco: Never Used   • Alcohol use Yes      Comment: socially   • Drug use: No   • Sexual activity: No     Other Topics Concern   • Not on file     Social History Narrative      Family History   Problem Relation Age of Onset   • Heart attack Maternal Grandmother    • Hypertension Maternal Grandmother    • Heart attack Maternal Grandfather    • Hypertension Maternal Grandfather    •  "Hypertension Paternal Grandmother    • Obesity Paternal Grandmother    • Heart attack Other      Paternal history   • Hypertension Other      Paternal & Maternal history   • Diabetes Other      Maternal history   • Hypertension Mother    • Diabetes Mother    • Heart attack Mother    • Osteoarthritis Mother    • Hypertension Father    • Heart attack Father    • Obesity Father    • Sleep apnea Father    • Diabetes Father    • Diabetes Sister    • Hypertension Sister    • Obesity Sister    • Hypertension Sister    • Obesity Sister        Review of Systems   Constitutional: Negative.    Gastrointestinal: Negative.    Genitourinary: Positive for dysuria and enuresis. Negative for pelvic pain, vaginal discharge and vaginal pain.           Objective   /70  Ht 167.6 cm (66\")  Wt (!) 159 kg (351 lb)  LMP 01/15/2013  Breastfeeding? No  BMI 56.65 kg/m2    Physical Exam   Constitutional: She appears well-developed and well-nourished. She is cooperative.   Morbidly obese   Genitourinary: There is no tenderness or lesion on the right labia. There is no tenderness or lesion on the left labia. Right adnexum displays no mass and no tenderness. Left adnexum displays no mass and no tenderness. No erythema, tenderness or bleeding in the vagina. No signs of injury around the vagina. No vaginal discharge found.   Neurological: She is alert.   Skin: Skin is warm and dry.   Psychiatric: She has a normal mood and affect. Her behavior is normal.            Assessment and Plan  Margarita was seen today for gynecologic exam.    Diagnoses and all orders for this visit:    Normal gynecologic examination    Abnormal vaginal bleeding      Patient Instructions   Patient with normal pelvic exam-do not see any source of bleeding from vagina. There is no granulation tissue at vaginal cuff, no vaginal wall abrasions or sign of vaginal bleeding. Tissue does not appear to be atrophic. Must consider blood could be from urinary tract as patient " being treated for UTI. She is instructed to call or return to office if has another recurrance. May need CT scan at that point                This note was electronically signed.    Miriam Miles PA-C   January 22, 2018

## 2018-01-16 NOTE — PATIENT INSTRUCTIONS
Patient with normal pelvic exam-do not see any source of bleeding from vagina. There is no granulation tissue at vaginal cuff, no vaginal wall abrasions or sign of vaginal bleeding. Tissue does not appear to be atrophic. Must consider blood could be from urinary tract as patient being treated for UTI. She is instructed to call or return to office if has another recurrance. May need CT scan at that point

## 2018-01-25 NOTE — PROGRESS NOTES
Subjective   Patient ID: Margarita Dominguez is a 52 y.o. female  Pain of the Left Knee (Patient reports 6/10 bilateral knee pain that worsened about 2 months ago. Patient reports falling about 2 years ago when she tripped in her yard. She has seen Dr. Nunez @ [facility] in the past for bilat ATS, steroid injections and aspirations. ) and Pain of the Right Knee             History of Present Illness  Long-term more than 1 year right anterolateral knee pain worse squatting bending getting out of a chair denies fall or injury, followed by Dr. Palacio for chronic poor healing wound on the right lower leg, extensive list of medications and associated medical morbidities including history of blood clotting iliac stent placement chronic anticoagulation therapy.      Review of Systems   Constitutional: Negative for diaphoresis, fever and unexpected weight change.   HENT: Negative for dental problem and sore throat.    Eyes: Negative for visual disturbance.   Respiratory: Negative for shortness of breath.    Cardiovascular: Negative for chest pain.   Gastrointestinal: Negative for abdominal pain, constipation, diarrhea, nausea and vomiting.   Genitourinary: Negative for difficulty urinating and frequency.   Musculoskeletal: Positive for arthralgias.   Neurological: Negative for headaches.   Hematological: Does not bruise/bleed easily.   All other systems reviewed and are negative.      Past Medical History:   Diagnosis Date   • Anxiety    • Arthritis    • Asthma    • Back pain 2015   • Chronic pain    • COPD (chronic obstructive pulmonary disease)    • Depression    • Diabetes mellitus 2012   • Esophageal spasm    • Fibromyalgia    • GERD (gastroesophageal reflux disease)    • Heart murmur    • Hyperlipidemia    • Hypertension    • IBS (irritable bowel syndrome)    • Joint pain    • Migraines    • Morbid obesity 8/22/2016   • Muscle spasm    • Obstructive sleep apnea    • Osteoarthritis    • Restless legs syndrome    • Sleep  apnea 2013   • Stress incontinence    • Syncope 8/22/2016    onset in summer 2015: “Negative” neurologic evaluation with negative CT and EEG.   • Tobacco use disorder    • Vertigo 2015   • Vitamin D deficiency         Past Surgical History:   Procedure Laterality Date   • APPENDECTOMY  1976   • BACK SURGERY  2015   • CARDIAC CATHETERIZATION  2014   • COLONOSCOPY  2017   • HYSTERECTOMY  2011   • ILIAC ARTERY STENT     • INTERVENTIONAL RADIOLOGY PROCEDURE Bilateral 8/18/2017   • INTERVENTIONAL RADIOLOGY PROCEDURE N/A 8/18/2017   • INTERVENTIONAL RADIOLOGY PROCEDURE Right 10/6/2017    Procedure: IR venogram lower extremity right;  Surgeon: Hayden Romo MD;  Location: [facility] CATH INVASIVE LOCATION;  Service:    • INTERVENTIONAL RADIOLOGY PROCEDURE N/A 10/6/2017    Procedure: IR intravascular ultrasound each additional non coronary vessel;  Surgeon: Hayden oRmo MD;  Location: [facility] CATH INVASIVE LOCATION;  Service:    • KNEE ARTHROSCOPY Right 2015   • MOUTH SURGERY     • TONSILLECTOMY  1970       Family History   Problem Relation Age of Onset   • Heart attack Maternal Grandmother    • Hypertension Maternal Grandmother    • Heart attack Maternal Grandfather    • Hypertension Maternal Grandfather    • Hypertension Paternal Grandmother    • Obesity Paternal Grandmother    • Heart attack Other      Paternal history   • Hypertension Other      Paternal & Maternal history   • Diabetes Other      Maternal history   • Hypertension Mother    • Diabetes Mother    • Heart attack Mother    • Osteoarthritis Mother    • Hypertension Father    • Heart attack Father    • Obesity Father    • Sleep apnea Father    • Diabetes Father    • Diabetes Sister    • Hypertension Sister    • Obesity Sister    • Hypertension Sister    • Obesity Sister        Social History     Social History   • Marital status:      Spouse name: Leon   • Number of children: N/A   • Years of education: N/A     Occupational History   •  "Not on file.     Social History Main Topics   • Smoking status: Current Some Day Smoker     Packs/day: 1.00     Years: 30.00     Types: Cigarettes   • Smokeless tobacco: Never Used   • Alcohol use Yes      Comment: socially   • Drug use: No   • Sexual activity: No     Other Topics Concern   • Not on file     Social History Narrative       I have reviewed all of the above social hx, family hx, surgical hx, medications, allergies & ROS and confirm that it is accurate.    Allergies   Allergen Reactions   • Morphine Shortness Of Breath   • Morphine And Related    • Adhesive Tape Rash     Bandaging tape   • Bactrim [Sulfamethoxazole-Trimethoprim] Nausea And Vomiting     diarrhea       Objective   Resp 16  Ht 167.6 cm (65.98\")  Wt (!) 156 kg (343 lb)  LMP 01/15/2013  BMI 55.39 kg/m2   Physical Exam  Constitutional: Patient is oriented to person, place, and time. Patient appears well-developed and well-nourished.   HENT:Head: Normocephalic and atraumatic.   Eyes: EOM are normal. Pupils are equal, round, and reactive to light.   Neck: Normal range of motion. Neck supple.   Cardiovascular: Normal rate.    Pulmonary/Chest: Effort normal and breath sounds normal.   Abdominal: Soft.   Neurological: Patient is alert and oriented to person, place, and time.   Skin: Skin is warm and dry.   Psychiatric: Patient has a normal mood and affect.   Nursing note and vitals reviewed.       Ortho Exam right knee was inferolateral patellofemoral tenderness no effusion full active extension negative Jenny sign good stability morbid obesity noted getting out of a chair she does experience anterolateral right knee pain.  Denies posterior knee pain walking giving way.  Assessment/Plan   Review of Radiographic Studies:    Radiographic images today of affected area I personally viewed and showed no sign of acute fracture or dislocation.      Procedures     Margarita was seen today for pain and pain.    Diagnoses and all orders for this " visit:    Chondromalacia of right knee  -     Ambulatory Referral to Physical Therapy Evaluate and treat     Physical therapy referral given      Recommendations/Plan:   Work/Activity Status: May perform usual activities as tolerated    Patient agreeable to call or return sooner for any concerns.             Impression:   Morbid obesity, right anterolateral knee pain chondromalacia patella, poor healing chronic wound right lower leg, compromised vascular status on anticoagulation therapy  Plan:  Weight loss, physical therapy, topical modalities, Tylenol, recheck 2 months, nonsurgical treatment advised for knee pain

## 2018-01-26 NOTE — PROGRESS NOTES
Subjective   Patient ID: Margarita Dominguez is a 52 y.o. female returns for worsening edema and swelling and draining of both legs.  She states she's had drainage in the last hour on the right leg.  She had contacted the office earlier requesting antibiotics were medication and I explained I could not prescribe her medication unless these were seen and evaluated.      History of Present Illness                                                   Review of Systems   Constitutional: Negative for diaphoresis, fever and unexpected weight change.   HENT: Negative for dental problem and sore throat.    Eyes: Negative for visual disturbance.   Respiratory: Negative for shortness of breath.    Cardiovascular: Negative for chest pain.   Gastrointestinal: Negative for abdominal pain, constipation, diarrhea, nausea and vomiting.   Genitourinary: Negative for difficulty urinating and frequency.   Neurological: Negative for headaches.   Hematological: Does not bruise/bleed easily.   All other systems reviewed and are negative.      Past Medical History:   Diagnosis Date   • Anxiety    • Arthritis    • Asthma    • Back pain 2015   • Chronic pain    • COPD (chronic obstructive pulmonary disease)    • Depression    • Diabetes mellitus 2012   • Esophageal spasm    • Fibromyalgia    • GERD (gastroesophageal reflux disease)    • Heart murmur    • Hyperlipidemia    • Hypertension    • IBS (irritable bowel syndrome)    • Joint pain    • Migraines    • Morbid obesity 8/22/2016   • Muscle spasm    • Obstructive sleep apnea    • Osteoarthritis    • Restless legs syndrome    • Sleep apnea 2013   • Stress incontinence    • Syncope 8/22/2016    onset in summer 2015: “Negative” neurologic evaluation with negative CT and EEG.   • Tobacco use disorder    • Vertigo 2015   • Vitamin D deficiency         Past Surgical History:   Procedure Laterality Date   • APPENDECTOMY  1976   • BACK SURGERY  2015   • CARDIAC CATHETERIZATION  2014   • COLONOSCOPY   2017   • HYSTERECTOMY  2011   • ILIAC ARTERY STENT     • INTERVENTIONAL RADIOLOGY PROCEDURE Bilateral 8/18/2017   • INTERVENTIONAL RADIOLOGY PROCEDURE N/A 8/18/2017   • INTERVENTIONAL RADIOLOGY PROCEDURE Right 10/6/2017    Procedure: IR venogram lower extremity right;  Surgeon: Hayden Romo MD;  Location: [facility] CATH INVASIVE LOCATION;  Service:    • INTERVENTIONAL RADIOLOGY PROCEDURE N/A 10/6/2017    Procedure: IR intravascular ultrasound each additional non coronary vessel;  Surgeon: Hayden Romo MD;  Location: [facility] CATH INVASIVE LOCATION;  Service:    • KNEE ARTHROSCOPY Right 2015   • MOUTH SURGERY     • TONSILLECTOMY  1970       Social History     Social History   • Marital status:      Spouse name: Leon   • Number of children: N/A   • Years of education: N/A     Occupational History   • Not on file.     Social History Main Topics   • Smoking status: Current Some Day Smoker     Packs/day: 1.00     Years: 30.00     Types: Cigarettes   • Smokeless tobacco: Never Used   • Alcohol use Yes      Comment: socially   • Drug use: No   • Sexual activity: No     Other Topics Concern   • Not on file     Social History Narrative       Ready to quit: Not Answered  Counseling given: No      I have reviewed all of the above social hx, family hx, surgical hx, medications, allergies & ROS and confirm that it is accurate.    Allergies   Allergen Reactions   • Morphine Shortness Of Breath   • Morphine And Related    • Adhesive Tape Rash     Bandaging tape   • Bactrim [Sulfamethoxazole-Trimethoprim] Nausea And Vomiting     diarrhea       Objective   Physical Exam   Constitutional: She is oriented to person, place, and time. She appears well-developed and well-nourished.   HENT:   Head: Normocephalic and atraumatic.   Eyes: EOM are normal. Pupils are equal, round, and reactive to light.   Neck: Normal range of motion.   Pulmonary/Chest: Effort normal.   Abdominal: Soft.   Musculoskeletal: Normal  range of motion.   Neurological: She is alert and oriented to person, place, and time. She has normal reflexes.   Skin: Skin is warm.   Psychiatric: She has a normal mood and affect. Her behavior is normal. Judgment and thought content normal.   Nursing note reviewed.    Ortho Exam  Ortho Exam        Assessment/Plan  bilateral lower extremity edema, morbid obesity, diabetes, bilateral lower extremity venous insufficiency both lower extremities show 4/5+ indurated edema.  The feet show slight pitting edema but the legs are very indurated and hyperpigmented.  Over the anterior and lateral distal right leg there is an area that measures approximately 5 x 7 cm that slightly elevated and raised and is increased in pigmentation compared to the surrounding skin.  This area is very tender to palpation to her.  It is not significantly warm or erythematous.  It's more hyper pigmented and purplish in color.  It does have some serosanguineous drainage and weeping from this area.    Independent Review of Radiographic Studies:      Laboratory and Other Studies:     Medical Decision Making:        Procedures  Debridement Note  She has Unna boots applied to bilateral lower extremities from the toes to the knee.  An absorptive dressing was placed over the anterior lateral right leg underneath the Unna boot.      Margarita was seen today for wound check and follow-up.    Diagnoses and all orders for this visit:    Venous insufficiency (chronic) (peripheral)    Edema of extremity of unknown cause    Type 2 diabetes mellitus with other skin ulcer, with long-term current use of insulin    Cellulitis and abscess of leg    Other orders  -     cephalexin (KEFLEX) 500 MG capsule; Take 1 capsule by mouth Every 12 (Twelve) Hours.        Recommendations/Plan:  I explained to her that I do not feel that her right leg is infected and shows relatively no signs of cellulitis but if it will make her feel better I will prescribe her an antibiotic.  We  applied an absorptive dressing underneath the Unna boot and Unna boots to bilateral lower extremities.  She is already taking 80 mg of Lasix a day and I explained that any additional medicines or intervention in regards to her swelling should be managed and dealt with by her primary care physician.  I explained I'm not comfortable prescribing dosage of Lasix above that or adding additional medications but due to potentially think IV diuresis may be a possible idea or suggestion.  She is already been seen by vascular surgery and intervention performed and was told there is nothing else to do.  We will continue with Unna boots and changes as needed.  Follow-up in 2 weeks.    Return in about 2 weeks (around 2/9/2018).  Patient agreeable to call or return sooner for any concerns.

## 2018-02-23 NOTE — PROGRESS NOTES
Subjective   Patient ID: Margarita Dominguez is a 52 y.o. female returns today still struggling to breathe and complaining of increased swelling and pain to both lower extremities.  She tells me that her gout has been very flared up and worsening.  She states that she is afraid she is infected and needs antibiotics.  She tells me that it wasn't that long ago that she saw her primary care physician.  I had previously recommended she discuss diaphoresis with him last month.  SHe complains of shortness of breath and difficulty breathing but no other constitutional symptoms other than pain.      History of Present Illness                                                   Review of Systems   Constitutional: Negative for diaphoresis, fever and unexpected weight change.   HENT: Negative for dental problem and sore throat.    Eyes: Negative for visual disturbance.   Respiratory: Negative for shortness of breath.    Cardiovascular: Negative for chest pain.   Gastrointestinal: Negative for abdominal pain, constipation, diarrhea, nausea and vomiting.   Genitourinary: Negative for difficulty urinating and frequency.   Skin: Positive for wound.   Neurological: Negative for headaches.   Hematological: Does not bruise/bleed easily.   All other systems reviewed and are negative.      Past Medical History:   Diagnosis Date   • Anxiety    • Arthritis    • Asthma    • Back pain 2015   • Chronic pain    • COPD (chronic obstructive pulmonary disease)    • Depression    • Diabetes mellitus 2012   • Esophageal spasm    • Fibromyalgia    • GERD (gastroesophageal reflux disease)    • Heart murmur    • Hyperlipidemia    • Hypertension    • IBS (irritable bowel syndrome)    • Joint pain    • Migraines    • Morbid obesity 8/22/2016   • Muscle spasm    • Obstructive sleep apnea    • Osteoarthritis    • Restless legs syndrome    • Sleep apnea 2013   • Stress incontinence    • Syncope 8/22/2016    onset in summer 2015: “Negative” neurologic  evaluation with negative CT and EEG.   • Tobacco use disorder    • Vertigo 2015   • Vitamin D deficiency         Past Surgical History:   Procedure Laterality Date   • APPENDECTOMY  1976   • BACK SURGERY  2015   • CARDIAC CATHETERIZATION  2014   • COLONOSCOPY  2017   • HYSTERECTOMY  2011   • ILIAC ARTERY STENT     • INTERVENTIONAL RADIOLOGY PROCEDURE Bilateral 8/18/2017   • INTERVENTIONAL RADIOLOGY PROCEDURE N/A 8/18/2017   • INTERVENTIONAL RADIOLOGY PROCEDURE Right 10/6/2017    Procedure: IR venogram lower extremity right;  Surgeon: Hayden Romo MD;  Location: [facility] CATH INVASIVE LOCATION;  Service:    • INTERVENTIONAL RADIOLOGY PROCEDURE N/A 10/6/2017    Procedure: IR intravascular ultrasound each additional non coronary vessel;  Surgeon: Hayden Romo MD;  Location: [facility] CATH INVASIVE LOCATION;  Service:    • KNEE ARTHROSCOPY Right 2015   • MOUTH SURGERY     • TONSILLECTOMY  1970       Social History     Social History   • Marital status:      Spouse name: Leon   • Number of children: N/A   • Years of education: N/A     Occupational History   • Not on file.     Social History Main Topics   • Smoking status: Current Some Day Smoker     Packs/day: 1.00     Years: 30.00     Types: Cigarettes   • Smokeless tobacco: Never Used   • Alcohol use Yes      Comment: socially   • Drug use: No   • Sexual activity: No     Other Topics Concern   • Not on file     Social History Narrative       Ready to quit: No  Counseling given: Yes      I have reviewed all of the above social hx, family hx, surgical hx, medications, allergies & ROS and confirm that it is accurate.    Allergies   Allergen Reactions   • Morphine Shortness Of Breath   • Morphine And Related    • Adhesive Tape Rash     Bandaging tape   • Bactrim [Sulfamethoxazole-Trimethoprim] Nausea And Vomiting     diarrhea       Objective   Physical Exam   Constitutional: She is oriented to person, place, and time. She appears well-developed  and well-nourished.   HENT:   Head: Normocephalic and atraumatic.   Eyes: EOM are normal. Pupils are equal, round, and reactive to light.   Neck: Normal range of motion.   Pulmonary/Chest: She is in respiratory distress.   Abdominal: Soft.   Musculoskeletal: Normal range of motion.   Neurological: She is alert and oriented to person, place, and time. She has normal reflexes.   Skin: Skin is warm.   Psychiatric: She has a normal mood and affect. Her behavior is normal. Judgment and thought content normal.     Ortho Exam  Ortho Exam  Bilateral lower extremities today show intense/severe lower extremity edema that is indurated about the legs and then as it disperse his down to the feet is extremely pitting.  Her feet almost looked as if they have tense filled full-thickness blisters however this is actually fluid underneath the skin.  Her trophic skin changes are worsening as well.  The roughened elevated raised painful area on the anterior right leg has increased in size and is very tender to palpation.  Her calves are very tender to palpation and painful.  Her entire bilateral lower extremities are painful.  She has an area on the right anterior leg that has some loss of skin and appears to be a superficial blistered area is very tender to palpation.  There is no significant abnormal warmth that I can appreciate that I would consider infectious in nature.  I don't appreciate any area clinically that appears to be of acute gout attack either.  She may have some underlying cellulitis due to the tense edema and swelling.      Assessment/Plan  diabetes, morbid obesity, venous insufficiency with fluid overload, venous stasis ulcerations, potential cellulitis  Independent Review of Radiographic Studies:      Laboratory and Other Studies:     Medical Decision Making:        Procedures  Debridement Note        Margarita was seen today for wound check and follow-up.    Diagnoses and all orders for this visit:    Venous  insufficiency (chronic) (peripheral)    Edema of extremity of unknown cause    Type 2 diabetes mellitus with other skin ulcer, with long-term current use of insulin    Cellulitis and abscess of leg    Localized edema          Recommendations/Plan:  I recommend that she consider being admitted to the hospital for IV diuresis and potential antibiotic therapy.  I explained that I don't think she has anything from an infection standpoint and would recommend she try the diuresis first but she may be given antibiotics concomitantly.  She seems to be in agreement with this and understanding.  I spoke with the hospitalist service and they will check to see if she has a bed.  She would be admitted to the telemetry floor.  I can monitor her while in the hospital and then she can follow up back in the office after discharge.    I am then called back after the hospitalist service because Dr. Romo.  Dr. oRmo recommended compression socks and states that we cannot diurese her.  She evidently has borderline renal function and diuresis would eventually injure that.  He recommended compression socks and follow-up with him in the office next week.    I discussed this with the patient and she states that she has changed cardiologists and is seeing someone from [facility] now.  She states she cannot walk on her feet.  Due to swelling as well as her body habitus she cannot get on compression socks.  After some length of discussion and time we agreed to wrap her in Unna boots.  I will start her back on antibiotics and we will see if this benefits her.  If she continues to worsen or deteriorate over the weekend or early next week I recommend she go to the emergency department or let me know.  If the Unna boots become bothersome she can let me know and they can be changed.    At this point I have no one willing to admit her and this is not a foot and ankle related issue for me to admit her.  This is strictly in need of medical  vascular treatment.    Return in about 2 weeks (around 3/9/2018).  Patient agreeable to call or return sooner for any concerns.

## 2018-03-01 NOTE — TELEPHONE ENCOUNTER
Returned patient's call. Patient stated that wound area causing pain. Patient stated that the pain had subsided. Informed patient to continue taking meds and elevate legs and if pain worsens to go to ER.

## 2018-03-29 NOTE — TELEPHONE ENCOUNTER
Received a fax from pt's insurance stating a drug interaction between ciprofloxacin and tizanidine. Per [facility] we will discontinue the tizanidine and start baclofen.     Pt has been notified and a baclofen has been ordered.

## 2018-03-30 NOTE — PROGRESS NOTES
Subjective   Patient ID: Margarita Dominguez is a 52 y.o. female multiple medical comorbidities returns for bilateral lower extremity venous insufficiency, cellulitis, right leg wound.  She does not have Unna boots on.  She has an open weeping area to the right leg.  She tells me home health had been applying Silvadene and wrapping Coban and directly on her skin.  She continues to complain of feeling bad.  States she's on 2 different antibiotics.      History of Present Illness                                                   Review of Systems   Constitutional: Negative for diaphoresis, fever and unexpected weight change.   HENT: Negative for dental problem and sore throat.    Eyes: Negative for visual disturbance.   Respiratory: Negative for shortness of breath.    Cardiovascular: Negative for chest pain.   Gastrointestinal: Negative for abdominal pain, constipation, diarrhea, nausea and vomiting.   Genitourinary: Negative for difficulty urinating and frequency.   Skin: Positive for wound.   Neurological: Negative for headaches.   Hematological: Does not bruise/bleed easily.   All other systems reviewed and are negative.      Past Medical History:   Diagnosis Date   • Anxiety    • Arthritis    • Asthma    • Back pain 2015   • Chronic pain    • COPD (chronic obstructive pulmonary disease)    • Depression    • Diabetes mellitus 2012   • Esophageal spasm    • Fibromyalgia    • GERD (gastroesophageal reflux disease)    • Heart murmur    • Hyperlipidemia    • Hypertension    • IBS (irritable bowel syndrome)    • Joint pain    • Migraines    • Morbid obesity 8/22/2016   • Muscle spasm    • Obstructive sleep apnea    • Osteoarthritis    • Restless legs syndrome    • Sleep apnea 2013   • Stress incontinence    • Syncope 8/22/2016    onset in summer 2015: “Negative” neurologic evaluation with negative CT and EEG.   • Tobacco use disorder    • Vertigo 2015   • Vitamin D deficiency         Past Surgical History:   Procedure  Laterality Date   • APPENDECTOMY  1976   • BACK SURGERY  2015   • CARDIAC CATHETERIZATION  2014   • COLONOSCOPY  2017   • HYSTERECTOMY  2011   • ILIAC ARTERY STENT     • INTERVENTIONAL RADIOLOGY PROCEDURE Bilateral 8/18/2017   • INTERVENTIONAL RADIOLOGY PROCEDURE N/A 8/18/2017   • INTERVENTIONAL RADIOLOGY PROCEDURE Right 10/6/2017    Procedure: IR venogram lower extremity right;  Surgeon: Hayden Romo MD;  Location: [facility] CATH INVASIVE LOCATION;  Service:    • INTERVENTIONAL RADIOLOGY PROCEDURE N/A 10/6/2017    Procedure: IR intravascular ultrasound each additional non coronary vessel;  Surgeon: Hayden Romo MD;  Location: [facility] CATH INVASIVE LOCATION;  Service:    • KNEE ARTHROSCOPY Right 2015   • MOUTH SURGERY     • TONSILLECTOMY  1970       Social History     Social History   • Marital status:      Spouse name: Leon   • Number of children: N/A   • Years of education: N/A     Occupational History   • Not on file.     Social History Main Topics   • Smoking status: Current Some Day Smoker     Packs/day: 1.00     Years: 30.00     Types: Cigarettes   • Smokeless tobacco: Never Used   • Alcohol use Yes      Comment: socially   • Drug use: No   • Sexual activity: No     Other Topics Concern   • Not on file     Social History Narrative   • No narrative on file       Ready to quit: No  Counseling given: No      I have reviewed all of the above social hx, family hx, surgical hx, medications, allergies & ROS and confirm that it is accurate.    Allergies   Allergen Reactions   • Morphine Shortness Of Breath   • Morphine And Related    • Adhesive Tape Rash     Bandaging tape   • Bactrim [Sulfamethoxazole-Trimethoprim] Nausea And Vomiting     diarrhea       Objective   Physical Exam   Constitutional: She is oriented to person, place, and time. She appears well-developed and well-nourished.   HENT:   Head: Normocephalic and atraumatic.   Eyes: EOM are normal. Pupils are equal, round, and  reactive to light.   Neck: Normal range of motion.   Pulmonary/Chest: She is in respiratory distress.   Abdominal: Soft.   Musculoskeletal: Normal range of motion.   Neurological: She is alert and oriented to person, place, and time. She has normal reflexes.   Skin: Skin is warm.   Psychiatric: She has a normal mood and affect. Her behavior is normal. Judgment and thought content normal.   Nursing note and vitals reviewed.    Ortho Exam  Ortho Exam  She still has the very elevated indurated swollen area to the anterior distal right leg.  She has an open weeping L-shaped wound to the anterior lateral right leg.  There is no warmth or redness.  She still complains of pain.  Pulses are nonpalpable due to the indurated edema.  Minimal pedal hair noted.  Gross sensations intact.    Assessment/Plan  diabetes, bilateral lower extremity venous insufficiency ulceration of right leg  Independent Review of Radiographic Studies:      Laboratory and Other Studies:     Medical Decision Making:            Procedures Unna boot was applied to the right lower extremity from the toes to the knee  Debridement Note    Margarita was seen today for follow-up and cellulitis.    Diagnoses and all orders for this visit:    Localized edema    Wound of right ankle, sequela    Type 2 diabetes mellitus with other skin ulcer, with long-term current use of insulin    Edema of extremity of unknown cause    Venous insufficiency (chronic) (peripheral)    Ulcer of foot, right, limited to breakdown of skin          Recommendations/Plan:  Additional home health orders were written and sent with her.  She is to have Unna boots changed once to 2 times weekly.  Monitor for any changes or sign of infection.  Follow-up in 2 weeks.    Return in about 2 weeks (around 4/13/2018).  Patient agreeable to call or return sooner for any concerns.

## 2018-04-10 NOTE — PROGRESS NOTES
Subjective   Patient ID: Margarita Dominguez is a 52 y.o. female is being seen for consultation today at the request of Castillo Fam,*  Chief Complaint: Neck and low back pain    History of Present Illness: The patient is a 52-year-old woman with a history of neck pain and low back pain has been bothering her fairly continuously over the past year.  It's worse with trying to stand or walk or even hold her head up.  If she stretches out for about 30 minutes she's better.  She has some spasms or cramping sensations along with the pain.  She had a lumbar discectomy L5-S1 on the right sided about 3 or 4 years ago.    She's had 2 knee surgeries, hysterectomy, iliac vein stenting, and heart catheterization.  She is bothered by obesity, sleep apnea, migraines, lower extremity edema with neuropathy, hypertension, diabetes, irritable bowel syndrome, COPD, gout.    Review of Radiographic Studies:  Cervical, thoracic, and lumbar spine MRI scans were done on 2/26/18.  Lumbar MRI scan shows degenerative disc at L4-5 and L5-S1 with prior laminotomy at L5-S1 on the right.  There is a small focal left-sided disc herniation L3-4 on the left.  Thoracic MRI scan is normal.  Cervical MRI scan shows a moderate stenosis at C5-C6.    The following portions of the patient's history were reviewed, updated as appropriate and approved: allergies, current medications, past family history, past medical history, past social history, past surgical history, review of systems and problem list.  Review of Systems   Constitutional: Negative for activity change, appetite change, chills, diaphoresis, fatigue, fever and unexpected weight change.   HENT: Negative for congestion, dental problem, drooling, ear discharge, ear pain, facial swelling, hearing loss, mouth sores, nosebleeds, postnasal drip, rhinorrhea, sinus pressure, sneezing, sore throat, tinnitus, trouble swallowing and voice change.    Eyes: Negative for photophobia, pain,  discharge, redness, itching and visual disturbance.   Respiratory: Negative for apnea, cough, choking, chest tightness, shortness of breath, wheezing and stridor.    Cardiovascular: Negative for chest pain, palpitations and leg swelling.   Gastrointestinal: Negative for abdominal distention, abdominal pain, anal bleeding, blood in stool, constipation, diarrhea, nausea, rectal pain and vomiting.   Endocrine: Negative for cold intolerance, heat intolerance, polydipsia, polyphagia and polyuria.   Genitourinary: Negative for decreased urine volume, difficulty urinating, dysuria, enuresis, flank pain, frequency, genital sores, hematuria and urgency.   Musculoskeletal: Positive for arthralgias, back pain, gait problem, joint swelling, myalgias, neck pain and neck stiffness.   Skin: Negative for color change, pallor, rash and wound.   Allergic/Immunologic: Negative for environmental allergies, food allergies and immunocompromised state.   Neurological: Positive for tremors, weakness, light-headedness and headaches. Negative for dizziness, seizures, syncope, facial asymmetry, speech difficulty and numbness.   Hematological: Negative for adenopathy. Does not bruise/bleed easily.   Psychiatric/Behavioral: Negative for agitation, behavioral problems, confusion, decreased concentration, dysphoric mood, hallucinations, self-injury, sleep disturbance and suicidal ideas. The patient is not nervous/anxious and is not hyperactive.        Objective     NEUROLOGICAL EXAMINATION:      MENTAL STATUS:  Alert and oriented.  Speech intact.  Recent and remote memory intact.      CRANIAL NERVES:  Cranial nerve II:  Visual fields are full.  Cranial nerves III, IV and VI:  PERRLADC.  Extraocular movements are intact.  Nystagmus is not present.  Cranial nerve V:  Facial sensation is intact.  Cranial nerve VII:  Muscles of facial expression reveal no asymmetry.  Cranial nerve VIII:  Hearing is intact.  Cranial nerves IX and X:  Palate elevates  symmetrically.  Cranial nerve XI:  Shoulder shrug is intact.  Cranial nerve XII:  Tongue is midline without evidence of atrophy or fasciculation.    MUSCULOSKELETAL:  SLR negative. Joel's test negative.  Significant lower extremity edema.  Weight 353 pounds.    MOTOR:  Deltoid, biceps, triceps, and  strength intact.  No hand atrophy.  Hip flexion, knee extension, ankle dorsiflexion and plantar flexion intact. No tremor, spasticity, ataxia, or dysmetria.    SENSATION: Intact upper and lower extremitie..    REFLEXES:  DTR absent in the upper and lower extremities.    Assessment   Chronic neck pain with degenerative disc and mild to moderate stenosis C5-C6 without evidence of myelopathy or cervical radiculopathy.  Degenerative disc L4-5 and L5-S1 with low back pain syndrome, asymptomatic herniated disc L3-4 left.       Plan   She is not a surgical candidate this time.  Symptoms should be treated medically for pain relief and weight loss would be extremely beneficial.       José Miguel Ramirez MD

## 2018-04-13 NOTE — PROGRESS NOTES
Subjective   Patient ID: Margarita Dominguez is a 52 y.o. female   She returns today for bilateral lower extremity wounds secondary to venous stasis/insufficiency.  Also requesting her toenails be cut.  She states that her and her primary care physician started changing her wrappings in doing them slightly different and she thinks it feels a little bit better.  She tells me they are using Neosporin with pain reliever followed by gauze and the wraps.    History of Present Illness                                                   Review of Systems   Skin: Positive for wound.       Past Medical History:   Diagnosis Date   • Anxiety    • Arthritis    • Asthma    • Back pain 2015   • Chronic pain    • COPD (chronic obstructive pulmonary disease)    • Depression    • Diabetes mellitus 2012   • Esophageal spasm    • Fibromyalgia    • GERD (gastroesophageal reflux disease)    • Heart murmur    • Hyperlipidemia    • Hypertension    • IBS (irritable bowel syndrome)    • Joint pain    • Migraines    • Morbid obesity 8/22/2016   • Muscle spasm    • Obstructive sleep apnea    • Osteoarthritis    • Restless legs syndrome    • Sleep apnea 2013   • Stress incontinence    • Syncope 8/22/2016    onset in summer 2015: “Negative” neurologic evaluation with negative CT and EEG.   • Tobacco use disorder    • Vertigo 2015   • Vitamin D deficiency         Past Surgical History:   Procedure Laterality Date   • APPENDECTOMY  1976   • BACK SURGERY  2015   • CARDIAC CATHETERIZATION  2014   • COLONOSCOPY  2017   • HYSTERECTOMY  2011   • ILIAC ARTERY STENT     • INTERVENTIONAL RADIOLOGY PROCEDURE Bilateral 8/18/2017   • INTERVENTIONAL RADIOLOGY PROCEDURE N/A 8/18/2017   • INTERVENTIONAL RADIOLOGY PROCEDURE Right 10/6/2017    Procedure: IR venogram lower extremity right;  Surgeon: Hayden Romo MD;  Location: [facility] INVASIVE LOCATION;  Service:    • INTERVENTIONAL RADIOLOGY PROCEDURE N/A 10/6/2017    Procedure: IR intravascular  ultrasound each additional non coronary vessel;  Surgeon: Hayden Romo MD;  Location: [facility] INVASIVE LOCATION;  Service:    • KNEE ARTHROSCOPY Right 2015   • MOUTH SURGERY     • TONSILLECTOMY  1970       Social History     Social History   • Marital status:      Spouse name: Leon   • Number of children: N/A   • Years of education: N/A     Occupational History   • Not on file.     Social History Main Topics   • Smoking status: Current Some Day Smoker     Packs/day: 1.00     Years: 30.00     Types: Cigarettes   • Smokeless tobacco: Never Used   • Alcohol use Yes      Comment: socially   • Drug use: No   • Sexual activity: No     Other Topics Concern   • Not on file     Social History Narrative   • No narrative on file       Ready to quit: No  Counseling given: Yes      I have reviewed all of the above social hx, family hx, surgical hx, medications, allergies & ROS and confirm that it is accurate.    Allergies   Allergen Reactions   • Morphine Shortness Of Breath   • Morphine And Related    • Adhesive Tape Rash     Bandaging tape   • Bactrim [Sulfamethoxazole-Trimethoprim] Nausea And Vomiting     diarrhea       Objective   Physical Exam   Constitutional: She is oriented to person, place, and time. She appears well-developed and well-nourished.   HENT:   Head: Normocephalic and atraumatic.   Eyes: EOM are normal. Pupils are equal, round, and reactive to light.   Neck: Normal range of motion.   Pulmonary/Chest: She is in respiratory distress.   Musculoskeletal: Normal range of motion.   Neurological: She is alert and oriented to person, place, and time. She has normal reflexes.   Skin: Skin is warm.   Psychiatric: She has a normal mood and affect. Her behavior is normal. Judgment and thought content normal.   Nursing note and vitals reviewed.    Ortho Exam  Ortho Exam  Both lower extremities are still significantly edematous and erythematous.  The feet are erythematous as well but she states  that maybe due to sunburn.  Edema to the feet is 3+ pitting.  Edema to the legs is 3+ indurated.  She has multiple elevated vesicles on the right leg which she questions.  She still has the large bulbous/elevated type area with a [place] type look to it over the anterior right distal leg.  She has weeping from both legs due to serous fluid.  Pulses are nonpalpable due to the edema.  Gross sensations intact.  Toenails to both feet are elongated, incurvated, thickened, his colored, with subungual debris      Assessment/Plan  diabetes, morbid obesity, venous insufficiency, onychomycosis  Independent Review of Radiographic Studies:      Laboratory and Other Studies:     Medical Decision Making:        Procedures  Debridement Note  Nails 1 through 5 on both feet were sharply debrided without incidence    Unna boots were placed to bilateral lower extremities with bacitracin, 4 x 4's, Unna boots from the toes to the knee bilaterally at her request.  Margarita was seen today for follow-up and wound check.    Diagnoses and all orders for this visit:    Type 2 diabetes mellitus with other skin ulcer, with long-term current use of insulin    Venous insufficiency (chronic) (peripheral)    Ulcer of foot, right, limited to breakdown of skin    Edema of extremity of unknown cause    Wound of right ankle, sequela    Onychomycosis    Skin ulcer of left lower leg, limited to breakdown of skin          Recommendations/Plan:  Her nails were cut.  The Unna boots were placed.  Call with concerns.  Follow-up in 3-4 weeks.  Monitor for infection.    Patient agreeable to call or return sooner for any concerns.

## 2018-05-21 NOTE — ED PROVIDER NOTES
Subjective   History of Present Illness   52F p/w L shoulder pain after fall.  Patient states that she stepped on something and tripped and caught herself on the sink with her left arm outstretched.  Felt a pop and has had pain in the left shoulder and upper arm since.  No weakness or numbness.  Did not hit head or fall to the ground.  No other complaints.  Took a hydrocodone 7.5 mg prior to arrival.    Review of Systems   Musculoskeletal: Positive for arthralgias.   All other systems reviewed and are negative.      Past Medical History:   Diagnosis Date   • Anxiety    • Arthritis    • Asthma    • Back pain 2015   • Chronic pain    • COPD (chronic obstructive pulmonary disease)    • Depression    • Diabetes mellitus 2012   • Esophageal spasm    • Fibromyalgia    • GERD (gastroesophageal reflux disease)    • Heart murmur    • Hyperlipidemia    • Hypertension    • IBS (irritable bowel syndrome)    • Joint pain    • Migraines    • Morbid obesity 8/22/2016   • Muscle spasm    • Obstructive sleep apnea    • Osteoarthritis    • Restless legs syndrome    • Sleep apnea 2013   • Stress incontinence    • Syncope 8/22/2016    onset in summer 2015: “Negative” neurologic evaluation with negative CT and EEG.   • Tobacco use disorder    • Vertigo 2015   • Vitamin D deficiency        Allergies   Allergen Reactions   • Morphine Shortness Of Breath   • Morphine And Related    • Adhesive Tape Rash     Bandaging tape   • Bactrim [Sulfamethoxazole-Trimethoprim] Nausea And Vomiting     diarrhea       Past Surgical History:   Procedure Laterality Date   • APPENDECTOMY  1976   • BACK SURGERY  2015   • CARDIAC CATHETERIZATION  2014   • COLONOSCOPY  2017   • HYSTERECTOMY  2011   • ILIAC ARTERY STENT     • INTERVENTIONAL RADIOLOGY PROCEDURE Bilateral 8/18/2017   • INTERVENTIONAL RADIOLOGY PROCEDURE N/A 8/18/2017   • INTERVENTIONAL RADIOLOGY PROCEDURE Right 10/6/2017    Procedure: IR venogram lower extremity right;  Surgeon: Hayden Prescott  MD Demetrius;  Location: [facility] CATH INVASIVE LOCATION;  Service:    • INTERVENTIONAL RADIOLOGY PROCEDURE N/A 10/6/2017    Procedure: IR intravascular ultrasound each additional non coronary vessel;  Surgeon: Hayden Romo MD;  Location: [facility] CATH INVASIVE LOCATION;  Service:    • KNEE ARTHROSCOPY Right 2015   • MOUTH SURGERY     • TONSILLECTOMY  1970       Family History   Problem Relation Age of Onset   • Heart attack Maternal Grandmother    • Hypertension Maternal Grandmother    • Heart attack Maternal Grandfather    • Hypertension Maternal Grandfather    • Hypertension Paternal Grandmother    • Obesity Paternal Grandmother    • Heart attack Other         Paternal history   • Hypertension Other         Paternal & Maternal history   • Diabetes Other         Maternal history   • Hypertension Mother    • Diabetes Mother    • Heart attack Mother    • Osteoarthritis Mother    • Hypertension Father    • Heart attack Father    • Obesity Father    • Sleep apnea Father    • Diabetes Father    • Diabetes Sister    • Hypertension Sister    • Obesity Sister    • Hypertension Sister    • Obesity Sister        Social History     Social History   • Marital status:      Spouse name: Leon     Social History Main Topics   • Smoking status: Current Some Day Smoker     Packs/day: 1.00     Years: 30.00     Types: Cigarettes   • Smokeless tobacco: Never Used   • Alcohol use Yes      Comment: socially   • Drug use: No   • Sexual activity: No     Other Topics Concern   • Not on file           Objective   Physical Exam   Constitutional: She is oriented to person, place, and time. She appears well-developed and well-nourished. No distress.   Morbidly obese   HENT:   Head: Normocephalic and atraumatic.   Neck: Normal range of motion.   Cardiovascular: Normal rate, regular rhythm, normal heart sounds and intact distal pulses.  Exam reveals no gallop and no friction rub.    No murmur heard.  Pulmonary/Chest: Effort normal.  No stridor. No respiratory distress. She has no wheezes. She has no rales.   Musculoskeletal: She exhibits tenderness. She exhibits no deformity.   Pain with abduction of the left shoulder.  Tender to palpation over the left anterior shoulder and proximal humerus.  No pain with range of motion of the left elbow or wrist.  Not tender to palpation otherwise throughout bilateral upper and lower extremities.  2+ edema bilateral lower extremities.   Neurological: She is alert and oriented to person, place, and time.   Able to make thumbs up, a-ok sign, cross fingers without difficulty. Able to flex and extend across all joints and sensation intact to all aspects of hand. CRF < 2 seconds. 2+ radial pulses bilaterally   Skin: Skin is warm and dry. Capillary refill takes less than 2 seconds. She is not diaphoretic.   Psychiatric: She has a normal mood and affect. Her behavior is normal.   Nursing note and vitals reviewed.      Procedures           ED Course                  MDM  52F here w/ L shoulder / upper arm pain after catching self on outstretched arm. AFVSS. Neurovascularly intact. Does not appear to be dislocated but some concern for fx vs strain vs other. Plan for xrays and reassessment.     7:58 AM x-rays no show acute fracture nor dislocations.  We'll discharge home with sling for comfort, recommendations for Tylenol and ibuprofen, and strict return to care precautions.    Final diagnoses:   Strain of left shoulder, initial encounter            Lam Esteban MD  05/21/18 1160

## 2018-05-24 NOTE — PROGRESS NOTES
Subjective   Margarita Dominguez is a 52 y.o. female     Chief Complaint   Patient presents with   • Follow-up     Pt is here for a Fu for her migraines. Pt states that her migraines are better since she has been going to the Chiropractor. Pt states that her left eye is completely shut and is not able to open it on her own. Pt states this has been ongoing for 2 weeks.        History of Present Illness     Patient is very pleasant 52-year-old female in clinic today to follow-up for her migraine history.  It's that her migraines have been controlled with her medication and chiropractic services.  However patient does state that she had a bacterial right eye infection approximate 2 weeks ago she did develop sepsis and had to do IV antibiotics.  States her sepsis resolved and the right eye infection resolved but she continues to be unable to open her right eye.  Patient states that all of the infection started approximately 2 weeks ago and prior to that she does admit that she did have diffuse diplopia which would've been about 3 weeks ago.  Patient has had fluctuating muscle fatigue states that it occurs in her upper extremities and in her chest.  She states that she is plain now from smoking and being overweight.  Patient states she still sees double when she manually opens from eyelid she states that she saw ophthalmology and they cleared her with her vision and advised her to see neurology.  Patient denies any rashes    Past hx: Pt is a 52 yr old female in clinic for cervical neck pain onset couple months ago. Denies recent trauma.  + cervical pain radiates right UE to shoulder.  + HAs 8-10 monthly lasting 4 hours or more. States starts right shoulder and radiates into skull. C/O migraine x 8 months with no relief through meds. Pt using fioricet and hydrocodone through pain clinic for back pain.  Pt with hx SABA states using CPAP nightly without difficulty. No chip in clinic.     The following portions of the  "patient's history were reviewed and updated as appropriate: allergies, current medications, past family history, past medical history, past social history, past surgical history and problem list.    Review of Systems   Constitutional: Negative for chills and fever.   HENT: Negative for congestion, ear pain, hearing loss, rhinorrhea and sore throat.    Eyes: Positive for photophobia and visual disturbance (+ diplopia). Negative for pain, discharge and redness.   Respiratory: Negative for cough, shortness of breath, wheezing and stridor.    Cardiovascular: Negative for chest pain, palpitations and leg swelling.   Gastrointestinal: Negative for abdominal pain, constipation, nausea and vomiting.   Endocrine: Negative for cold intolerance, heat intolerance and polyphagia.   Genitourinary: Negative for dysuria, flank pain, frequency and urgency.   Musculoskeletal: Negative for joint swelling, myalgias, neck pain and neck stiffness.   Skin: Negative for pallor, rash and wound.   Allergic/Immunologic: Negative for environmental allergies.   Neurological: Positive for weakness and headaches. Negative for dizziness, tremors, seizures, syncope, facial asymmetry, speech difficulty, light-headedness and numbness.   Hematological: Negative for adenopathy.   Psychiatric/Behavioral: Negative for confusion and hallucinations. The patient is not nervous/anxious.        Objective         Vitals:    05/24/18 1132   BP: Comment: PT REFUSED.   Pulse: 100   SpO2: 97%   Weight: (!) 154 kg (339 lb)   Height: 167.6 cm (66\")     GENERAL: Patient is pleasant, cooperative, appears to be stated age.  Body habitus is endomorphic.  SKIN AND EXTREMITIES:  No skin rashes or lesions are noted.  No cyanosis, clubbing or edema of the extremities.    HEAD:  Head is normocephalic and atraumatic.    NECK: Neck are non-tender without thyromegaly or adenopathy.  Carotic upstrokes are 1+/4.  No cranial or cervical bruits.  The neck is supple with a full " range of motion.   CARDIOVASCULAR:  Regular rate and rhythm with normal S1 and S2 without rub or gallop.  RESPIRATORY:  Clear to auscultation without wheezes or crackle   BACK:  Back is straight without midline defect.    PSYCH:  Higher cortical function/mental status:  The patient is alert.  She  is oriented x3 to time, place and person.  Recent and the remote memory appear normal.  The patient has a good fund of knowledge.  There is no visual or auditory hallucination or suicidal or homicidal ideation.  SPEECH:There is no gross evidence of aphasia, dysarthria or agnosia.      CRANIAL NERVES:  Right pupil is dilated and fixed.  Right eyelidand manually resisted patient then begins to see double.  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    MOTOR: Strength is 5/5 throughout with normal tone and bulk except left upper extremity patient is in a sling and has a rotator cuff tear reported  No involuntary movements noted.    SENSATION:  Intact to pinprick, light touch, vibration and proprioception.  Coordination:  The patient normally performs finger-nose-finger  COORDINATION AND GAIT:  The patient walks with a wide-based limped gait   Results for orders placed or performed during the hospital encounter of 04/26/18   Comprehensive Metabolic Panel   Result Value Ref Range    Glucose 240 (H) 74 - 98 mg/dL    BUN 47 (H) 7 - 20 mg/dL    Creatinine 1.30 0.60 - 1.30 mg/dL    Sodium 134 (L) 137 - 145 mmol/L    Potassium 2.7 (C) 3.5 - 5.1 mmol/L    Chloride 86 (L) 98 - 107 mmol/L    CO2 34.0 (H) 26.0 - 30.0 mmol/L    Calcium 9.3 8.4 - 10.2 mg/dL    Total Protein 7.1 6.3 - 8.2 g/dL    Albumin 3.90 3.50 - 5.00 g/dL    ALT (SGPT) 34 13 - 69 U/L    AST (SGOT) 26 15 - 46 U/L    Alkaline Phosphatase 103 38 - 126 U/L    Total Bilirubin 0.4 0.2 - 1.3 mg/dL    eGFR Non African Amer 43 (L) >60 mL/min/1.73    Globulin 3.2 gm/dL    A/G Ratio 1.2 1.0 - 2.0 g/dL     BUN/Creatinine Ratio 36.2 (H) 7.1 - 23.5    Anion Gap 16.7 10.0 - 20.0 mmol/L   Lipase   Result Value Ref Range    Lipase 341 (H) 23 - 300 U/L   Urinalysis With / Microscopic If Indicated - Urine, Clean Catch   Result Value Ref Range    Color, UA Yellow Yellow, Straw    Appearance, UA Clear Clear    pH, UA 7.0 5.0 - 8.0    Specific Gravity, UA 1.015 1.005 - 1.030    Glucose, UA Negative Negative    Ketones, UA Negative Negative    Bilirubin, UA Negative Negative    Blood, UA Negative Negative    Protein, UA Negative Negative    Leuk Esterase, UA Negative Negative    Nitrite, UA Negative Negative    Urobilinogen, UA 0.2 E.U./dL 0.2 - 1.0 E.U./dL   CBC Auto Differential   Result Value Ref Range    WBC 12.88 (H) 4.80 - 10.80 10*3/mm3    RBC 4.82 4.20 - 5.40 10*6/mm3    Hemoglobin 13.1 12.0 - 16.0 g/dL    Hematocrit 40.2 37.0 - 47.0 %    MCV 83.4 81.0 - 99.0 fL    MCH 27.2 27.0 - 31.0 pg    MCHC 32.6 30.0 - 37.0 g/dL    RDW 15.2 (H) 11.5 - 14.5 %    RDW-SD 46.1 37.0 - 54.0 fl    MPV 10.3 6.0 - 12.0 fL    Platelets 213 130 - 400 10*3/mm3    Neutrophil % 72.4 37.0 - 80.0 %    Lymphocyte % 17.9 10.0 - 50.0 %    Monocyte % 6.8 0.0 - 12.0 %    Eosinophil % 1.6 0.0 - 7.0 %    Basophil % 0.5 0.0 - 2.5 %    Immature Grans % 0.8 (H) 0.0 - 0.6 %    Neutrophils, Absolute 9.34 (H) 2.00 - 6.90 10*3/mm3    Lymphocytes, Absolute 2.30 0.60 - 3.40 10*3/mm3    Monocytes, Absolute 0.88 0.00 - 0.90 10*3/mm3    Eosinophils, Absolute 0.20 0.00 - 0.70 10*3/mm3    Basophils, Absolute 0.06 0.00 - 0.20 10*3/mm3    Immature Grans, Absolute 0.10 (H) 0.00 - 0.06 10*3/mm3    nRBC 0.0 0.0 - 0.0 /100 WBC   Magnesium   Result Value Ref Range    Magnesium 1.8 1.6 - 2.3 mg/dL       Xr Shoulder 2+ View Left    Result Date: 5/21/2018  Narrative: PROCEDURE: XR SHOULDER 2+ VW LEFT-  History: fall  COMPARISON: None.  FINDINGS:  A 3 view exam demonstrates no acute fracture or dislocation. The joint spaces are preserved. No soft tissue abnormality is seen.          Impression: No acute fracture.       This report was finalized on 5/21/2018 7:53 AM by Leta Barraza M.D..    Xr Humerus Left    Result Date: 5/21/2018  Narrative: PROCEDURE: XR HUMERUS LEFT-  HISTORY: fall  COMPARISON: None.  FINDINGS:  A 2 view exam demonstrates no acute fracture or dislocation. The joint spaces are preserved.  No soft tissue abnormality is seen.         Impression: No acute fracture.       This report was finalized on 5/21/2018 7:53 AM by Leta Barraza M.D..    Mri Brain Without Contrast    Result Date: 5/8/2018  Narrative: PROCEDURE: MRI BRAIN WO CONTRAST-  HISTORY: HEADACHES; G44.229-Chronic tension-type headache, not intractable  PROCEDURE: Multiplanar multisequence imaging of the brain was performed without the use of intravenous contrast.  COMPARISON: None.  FINDINGS: There is motion artifact. There are no significant white matter abnormalities. There is no mass, mass effect or midline shift. There is no hydrocephalus. There are no areas of restricted diffusion.    The midbrain, mirtha, cerebellum and craniocervical junction are unremarkable. The sella and pituitary gland are within normal limits. The major intracranial vasculature demonstrates the expected flow related signal. The paranasal sinuses are clear.      Impression: Unremarkable MRI of the brain.      This report was finalized on 5/8/2018 8:16 AM by Leta Barraza M.D..      I have personally reviewed the above labs.    Assessment/Plan   1. SABA: CPAP reports using nightly. No acute pain clinic today.  Patient will return to clinic in one week advised to bring her CPAP chip.    I have counseled patient extensively on Sleep Hygiene including regular sleep wake schedule and stimulus control therapy.  I have also discussed the importance of weight reduction because 10% reduction in body weight can reduce sleep apnea by 50 %. We have also discussed abstaining from smoking and drinking.  I have explained to patient that  obstructive apnea episode is defined as the absence of airflow for at least 10 seconds.  Sleep apnea is usually accompanied by snoring, disturbed sleep, and daytime sleepiness. Patients with micrognathia, retrognathia, enlarged tonsils, tongue enlargement, and acromegaly are especially predisposed to obstructive sleep apnea. Abnormalities or weakness in the muscles can also contribute to obstructive sleep apnea. Obesity can also contribute to sleep apnea.      Sleep apnea can lead to a number of complications, ranging from daytime sleepiness to possible increased risk of cardiovascular risks.   Daytime sleepiness is the most serious.  Daytime sleepiness can also increase the risk for accident-related injuries. Several studies have suggested that people with sleep apnea have two to three times as many car accidents, and five to seven times the risk for multiple accidents.   A number of cardiovascular diseases -- including high blood pressure, heart failure, stroke, and heart arrhythmias -- have an association with obstructive sleep apnea.   Up to a third of patients with heart failure also have sleep apnea. Both central and obstructive sleep apnea are linked with heart failure. Obstructive sleep apnea is also noted to be associated with type 2 diabetes according to Dr. Viera at [facility].  The best treatment for symptomatic obstructive sleep apnea is continuous positive airflow pressure (CPAP). Bilevel positive airway pressure (BPAP) systems may be particularly helpful for patients with coexisting lung disease and those with excessive levels of carbon dioxide.  Other treatment options including UPPP surgery, LAUP surgery, radiofrequency somnoplasty and dental appliances such German or Clearway.        2. RLS: With [employer].     3. Cervicalgia with migraines:With chiropractic services and Zanaflex.    4.  Right ptosis with diplopia and muscle weakness: Positive third nerve palsy: MRI of the brain does not reveal  any issues with brain positive issues with sinusitis. Sched MRA brain Stat with labs today

## 2018-05-29 NOTE — TELEPHONE ENCOUNTER
Pt called with c/o pain, swelling and pressure in Right side of face; especially eye. Per Dr Trent- vladislav pt on medrol dose pack, recommend pt follow up with optometrist. Pt verbalized understanding. Pt was also advised to keep appt 6/1 with this office to review lab results

## 2018-06-01 PROBLEM — H49.01 WEAKNESS OF RIGHT THIRD CRANIAL NERVE: Status: ACTIVE | Noted: 2018-01-01

## 2018-06-01 NOTE — PROGRESS NOTES
[facility] NEUROLOGY [place] PROGRESS NOTE  History of Present Illness     Date: 6/3/2018    Patient Identification  Margarita Dominguez is a 52 y.o. female.    Patient information was obtained from patient.  History/Exam limitations: none.    Original consultation requested by: Castillo Fam M.D.      Chief Complaint   Follow-up (Pt in office today for 1 wk follow up, 3rd nerve palsy )      History of Present Illness   Patient is a pleasant 52-year-old referred to [facility] neurology [place] for evaluation of third nerve palsy associated with headache.  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  We have discussed with patient referral to neurosurgery and rheumatologist for further evaluation.    PMH:   Past Medical History:   Diagnosis Date   • Anxiety    • Arthritis    • Asthma    • Back pain 2015   • Chronic pain    • COPD (chronic obstructive pulmonary disease)    • Depression    • Diabetes mellitus 2012   • Esophageal spasm    • Fibromyalgia    • GERD (gastroesophageal reflux disease)    • Heart murmur    • Hyperlipidemia    • Hypertension    • IBS (irritable bowel syndrome)    • Joint pain    • Migraines    • Morbid obesity 8/22/2016   • Muscle spasm    • Obstructive sleep apnea    • Osteoarthritis    • Restless legs syndrome    • Sleep apnea 2013   • Stress incontinence    • Syncope 8/22/2016    onset in summer 2015: “Negative” neurologic evaluation with negative CT and EEG.   • Tobacco use disorder    • Vertigo 2015   • Vitamin D deficiency        Past Surgical History:   Past Surgical History:   Procedure Laterality Date   • APPENDECTOMY  1976   • BACK SURGERY  2015   • CARDIAC CATHETERIZATION  2014   • COLONOSCOPY  2017   • HYSTERECTOMY  2011   • ILIAC ARTERY STENT     • INTERVENTIONAL RADIOLOGY PROCEDURE Bilateral 8/18/2017   • INTERVENTIONAL RADIOLOGY  PROCEDURE N/A 8/18/2017   • INTERVENTIONAL RADIOLOGY PROCEDURE Right 10/6/2017    Procedure: IR venogram lower extremity right;  Surgeon: Hayden Romo MD;  Location: [facility] CATH INVASIVE LOCATION;  Service:    • INTERVENTIONAL RADIOLOGY PROCEDURE N/A 10/6/2017    Procedure: IR intravascular ultrasound each additional non coronary vessel;  Surgeon: Hayden Romo MD;  Location: [facility] CATH INVASIVE LOCATION;  Service:    • KNEE ARTHROSCOPY Right 2015   • MOUTH SURGERY     • TONSILLECTOMY  1970       Family Hisotry:   Family History   Problem Relation Age of Onset   • Heart attack Maternal Grandmother    • Hypertension Maternal Grandmother    • Heart attack Maternal Grandfather    • Hypertension Maternal Grandfather    • Hypertension Paternal Grandmother    • Obesity Paternal Grandmother    • Heart attack Other         Paternal history   • Hypertension Other         Paternal & Maternal history   • Diabetes Other         Maternal history   • Hypertension Mother    • Diabetes Mother    • Heart attack Mother    • Osteoarthritis Mother    • Hypertension Father    • Heart attack Father    • Obesity Father    • Sleep apnea Father    • Diabetes Father    • Diabetes Sister    • Hypertension Sister    • Obesity Sister    • Hypertension Sister    • Obesity Sister        Social History:   Social History     Social History   • Marital status:      Spouse name: Leon   • Number of children: N/A   • Years of education: N/A     Occupational History   • Not on file.     Social History Main Topics   • Smoking status: Current Some Day Smoker     Packs/day: 1.00     Years: 30.00     Types: Cigarettes   • Smokeless tobacco: Never Used   • Alcohol use Yes      Comment: socially   • Drug use: No   • Sexual activity: No     Other Topics Concern   • Not on file     Social History Narrative   • No narrative on file       Medications:   Current Outpatient Prescriptions   Medication Sig Dispense Refill   • albuterol  (PROVENTIL HFA;VENTOLIN HFA) 108 (90 BASE) MCG/ACT inhaler Inhale 1-2 puffs every 6 (six) hours as needed.     • ampicillin (PRINCIPEN) 500 MG capsule Take 1 capsule by mouth 4 (Four) Times a Day. 60 capsule 2   • aspirin  MG EC tablet Take 1 tablet by mouth daily.     • baclofen (LIORESAL) 10 MG tablet Take 1 tablet by mouth 3 (Three) Times a Day. 90 tablet 1   • Biotin 5 MG capsule Take 5 mg by mouth 2 (Two) Times a Day.     • butalbital-acetaminophen-caffeine (FIORICET, ESGIC) -40 MG per tablet Take 1 tablet by mouth Every 6 (Six) Hours As Needed for Headache. 10 tablet 0   • cephalexin (KEFLEX) 500 MG capsule Take 1 capsule by mouth Every 12 (Twelve) Hours. 30 capsule 0   • CHANTIX STARTING MONTH FERNANDEZ 0.5 MG X 11 & 1 MG X 42 tablet TK UTD  0   • chlorthalidone (HYGROTEN) 50 MG tablet   3   • ciprofloxacin (CIPRO) 500 MG tablet   0   • clindamycin (CLEOCIN T) 1 % external solution KEYONNA TO ABDOMEN BID UTD  1   • clonazePAM (KlonoPIN) 0.5 MG tablet Take 0.5 mg by mouth Every 12 (Twelve) Hours As Needed.     • clopidogrel (PLAVIX) 75 MG tablet TK 1 T PO QD  3   • clopidogrel (PLAVIX) 75 MG tablet Take 75 mg by mouth.     • colchicine 0.6 MG tablet TK 1 T PO NOW AND REPEAT IN 1 HOUR  0   • colestipol (COLESTID) 1 G tablet Take 1 g by mouth 2 (Two) Times a Day.     • dexlansoprazole (DEXILANT) 60 MG capsule Take 60 mg by mouth Every Night.     • diazePAM (VALIUM) 10 MG tablet      • EPINEPHrine (EPIPEN) 0.3 MG/0.3ML solution auto-injector injection Inject 0.3 mL into the shoulder, thigh, or buttocks.     • estradiol (ESTRACE) 1 MG tablet Take 2 mg by mouth Daily.     • estradiol (ESTRACE) 2 MG tablet TK 1 T PO QD  5   • febuxostat (ULORIC) 40 MG tablet Take 60 mg by mouth.     • fluconazole (DIFLUCAN) 100 MG tablet TK 1 T PO QD  0   • Fluticasone Furoate-Vilanterol (BREO ELLIPTA) 200-25 MCG/INH aerosol powder  Inhale 2 puffs Daily.     • Fluticasone Furoate-Vilanterol 200-25 MCG/INH aerosol powder  Inhale.      • furosemide (LASIX) 20 MG tablet Take 20 mg by mouth.     • furosemide (LASIX) 40 MG tablet Take 1 tablet by mouth 2 (Two) Times a Day. (Patient taking differently: Take 40 mg by mouth Daily.) 60 tablet 0   • HYDROcodone-acetaminophen (NORCO) 5-325 MG per tablet Take  by mouth.     • HYDROcodone-acetaminophen (NORCO) 7.5-325 MG per tablet TK 1 T PO Q 8-12 H PRN P  0   • INCRUSE ELLIPTA 62.5 MCG/INH aerosol powder       • ipratropium (ATROVENT) 0.03 % nasal spray USE 1 SPRAY IN EACH NOSTRIL BID  0   • ipratropium (ATROVENT) 0.06 % nasal spray 2 sprays into each nostril 4 (Four) Times a Day.     • levalbuterol (XOPENEX) 1.25 MG/3ML nebulizer solution Take 1 ampule by nebulization Every 8 (Eight) Hours As Needed for wheezing.     • levocetirizine (XYZAL) 5 MG tablet Take 5 mg by mouth Daily.     • linezolid (ZYVOX) 600 MG tablet      • lisinopril (PRINIVIL,ZESTRIL) 10 MG tablet Take 2.5 mg by mouth Daily.  2   • lisinopril (PRINIVIL,ZESTRIL) 2.5 MG tablet   2   • lisinopril (PRINIVIL,ZESTRIL) 20 MG tablet Take 2.5 mg by mouth.     • MethylPREDNISolone (MEDROL, FERNANDEZ,) 4 MG tablet Take as directed on package instructions. 1 each 0   • metOLazone (ZAROXOLYN) 2.5 MG tablet Take 2.5 mg by mouth Daily.     • metoprolol tartrate (LOPRESSOR) 25 MG tablet TK 1 T PO BID  2   • metoprolol tartrate (LOPRESSOR) 25 MG tablet Take 25 mg by mouth.     • minocycline (MINOCIN,DYNACIN) 100 MG capsule   0   • montelukast (SINGULAIR) 10 MG tablet Take 10 mg by mouth Daily.     • nitrofurantoin, macrocrystal-monohydrate, (MACROBID) 100 MG capsule   0   • nitroglycerin (NITROSTAT) 0.4 MG SL tablet Place  under the tongue.     • ondansetron ODT (ZOFRAN-ODT) 4 MG disintegrating tablet DIS 1 T ON THE TONGUE TID PRF NAUSEA  0   • ondansetron ODT (ZOFRAN-ODT) 4 MG disintegrating tablet Take 1 tablet by mouth Every 8 (Eight) Hours As Needed for Nausea or Vomiting. 10 tablet 0   • ONETOUCH DELICA LANCETS 33G misc      • ONETOUCH VERIO test strip       • oxybutynin XL (DITROPAN-XL) 10 MG 24 hr tablet      • penicillin v potassium (VEETID) 500 MG tablet   0   • Potassium 75 MG tablet Take  by mouth.     • potassium chloride (K-DUR,KLOR-CON) 20 MEQ CR tablet Take 1 tablet by mouth 2 (Two) Times a Day. 14 tablet 0   • potassium chloride (MICRO-K) 10 MEQ CR capsule Take 10 mEq by mouth 2 (Two) Times a Day.  2   • pramipexole (MIRAPEX) 1.5 MG tablet   5   • pseudoephedrine-guaifenesin (MUCINEX D)  MG per 12 hr tablet Take 1 tablet by mouth As Needed for Allergies.     • ranitidine (ZANTAC) 300 MG tablet Take 300 mg by mouth 2 (Two) Times a Day.     • spironolactone (ALDACTONE) 50 MG tablet Take 50 mg by mouth 2 (Two) Times a Day.     • spironolactone (ALDACTONE) 50 MG tablet Take 50 mg by mouth.     • SSD 1 % cream      • tiZANidine (ZANAFLEX) 4 MG tablet TK 1 T PO TID  0   • ULORIC 40 MG tablet 40 mg Every Night.  3   • ULORIC 80 MG tablet tablet TK 1 T PO D  3     No current facility-administered medications for this visit.        Allergy:   Allergies   Allergen Reactions   • Morphine Shortness Of Breath   • Morphine And Related    • Adhesive Tape Rash     Bandaging tape   • Bactrim [Sulfamethoxazole-Trimethoprim] Nausea And Vomiting     diarrhea       Review of Systems:  Review of Systems   Constitutional: Negative for chills and fever.   HENT: Negative for congestion, ear pain, hearing loss, rhinorrhea and sore throat.    Eyes: Positive for photophobia, pain and visual disturbance. Negative for discharge and redness.   Respiratory: Negative for cough, shortness of breath, wheezing and stridor.    Cardiovascular: Negative for chest pain, palpitations and leg swelling.   Gastrointestinal: Negative for abdominal pain, constipation, nausea and vomiting.   Endocrine: Negative for cold intolerance, heat intolerance and polyphagia.   Genitourinary: Negative for dysuria, flank pain, frequency and urgency.   Musculoskeletal: Negative for joint swelling, myalgias,  "neck pain and neck stiffness.   Skin: Negative for pallor, rash and wound.   Allergic/Immunologic: Negative for environmental allergies.   Neurological: Positive for headaches. Negative for dizziness, tremors, seizures, syncope, facial asymmetry, speech difficulty, weakness, light-headedness and numbness.   Hematological: Negative for adenopathy.   Psychiatric/Behavioral: Negative for confusion and hallucinations. The patient is not nervous/anxious.        Physical Exam     Vitals:    06/01/18 1421   BP: 138/68   Pulse: 80   SpO2: 95%   Weight: (!) 154 kg (339 lb)   Height: 167.6 cm (66\")     GENERAL: Patient is pleasant, cooperative, appears to be stated age.  Body habitus is endomorphic.  SKIN AND EXTREMITIES:  No skin rashes or lesions are noted.  No cyanosis, clubbing or edema of the extremities.    HEAD:  Head is normocephalic and atraumatic.    NECK: Neck are non-tender without thyromegaly or adenopathy.  Carotic upstrokes are 1+/4.  No cranial or cervical bruits.  The neck is supple with a full range of motion.   ENT: palate elevate symmetrically, no evidence of high arch palate, tongue midline erythema in posterior pharynx, Mallampati Classification Class III   CARDIOVASCULAR:  Regular rate and rhythm with normal S1 and S2 without rub or gallop.  RESPIRATORY:  Clear to auscultation without wheezes or crackle   ABDOMEN:  Soft and non-tender, positive bowel sound without hepatosplenomegaly  BACK:  Back is straight without midline defect.    PSYCH:  Higher cortical function/mental status:  The patient is alert.  She is oriented x3 to time, place and person.  Recent and the remote memory appear normal.  The patient has a good fund of knowledge.  There is no visual or auditory hallucination or suicidal or homicidal ideation.  SPEECH:There is no gross evidence of aphasia, dysarthria or agnosia.      CRANIAL NERVES:  Pupils are 4mm, equal round reactive to light, reacting briskly to 2mm without afferent pupillary " defect.  Visual fields are intact to confrontation testing.  Fundoscopic examination reveals sharp disk margins with normal vasculature.  No papilledema, hemorrhages or exudates.  Extraocular movements are full and smooth with normal pursuits and saccades.  No nystagmus noted.  The face is symmetric. palate elevate symmetrically, Tongue midline, positive gag reflex. The remainder of the cranial nerves are intact and symmetrical.    MOTOR: Strength is 5/5 throughout with normal tone and bulk with the following exceptions, 4/5 intrinsic muscles of the hands and feet.  No involuntary movements noted.    Deep Tendon Reflexes: are 2/4 and symmetrical in the upper extremities, 2/4 and symmetrical at the knees and 1/4 and symmetrical at the Achilles tendon.  Plantar responses were down-going bilaterally.    SENSATION:  Intact to pinprick, light touch, vibration and proprioception.  Coordination:  The patient normally performs finger-nose-finger, heel-to-knee-to-shin and rapid alternating movements in symmetrical fashion.    COORDINATION AND GAIT:  The patient walks with a narrow-based gait.  Patient is able to heel-toe and tandem walk forward and backwards without difficulty.  Romberg and monopedal  Romberg are negative.    MUSCULOSKELETAL: Range of motion normal, no clubbing, cyanosis, or edema.  No joint swelling.            Records Reviewed: I have personally reviewed her previous medical record.    Margarita was seen today for follow-up.    Diagnoses and all orders for this visit:    Weakness of right third cranial nerve  -     Ambulatory Referral to Rheumatology  -     Ambulatory Referral to Neurosurgery  -     Sjogren's Antibody, Anti-SS-A / -SS-B; Future  -     Lupus Anticoagulant Panel; Future  -     Angiotensin Converting Enzyme; Future        Treatments:    1. We'll refer this patient to neurosurgery to evaluate for posterior communicating artery aneurysm for her complete third nerve palsy.  Henning bleed can give  rise to chronic headache  2.  Refer to rheumatologist to evaluate for CNS lupus, Sjogren syndrome, neurosarcoidosis complete third nerve palsy.  3. We'll referred to neuro ophthalmology.    This Document is signed by Peewee Trent MD, FAAN, FAASM   [place] 3, [id number] PM

## 2018-06-19 NOTE — TELEPHONE ENCOUNTER
Pt is on the workque to see Neurosurgery but Dr Ramirez's office says this is not something that Neurosurg treats. Mra was negative and ask if we would advise on any other reaon pt needs to be seen.    thanks

## 2018-07-09 NOTE — TELEPHONE ENCOUNTER
Please close the order for neurosurg. Dr Trent has been informed they can not see this pt. Neurosurg says this is a Neurology dx.      thanks

## 2018-10-04 ENCOUNTER — DOCUMENTATION (OUTPATIENT)
Dept: CARDIOLOGY | Facility: CLINIC | Age: 53
End: 2018-10-04
